# Patient Record
Sex: FEMALE | Race: OTHER | Employment: FULL TIME | URBAN - METROPOLITAN AREA
[De-identification: names, ages, dates, MRNs, and addresses within clinical notes are randomized per-mention and may not be internally consistent; named-entity substitution may affect disease eponyms.]

---

## 2023-03-31 ENCOUNTER — TELEPHONE (OUTPATIENT)
Dept: PRIMARY CARE | Facility: CLINIC | Age: 43
End: 2023-03-31
Payer: COMMERCIAL

## 2023-03-31 NOTE — TELEPHONE ENCOUNTER
Patient was prescribed   Amoxicillin for strep throat she is checking if she has an allergy or not please advise     [phone]

## 2023-04-23 ASSESSMENT — PROMIS GLOBAL HEALTH SCALE
RATE_AVERAGE_PAIN: 0
CARRYOUT_PHYSICAL_ACTIVITIES: COMPLETELY
EMOTIONAL_PROBLEMS: NEVER
CARRYOUT_SOCIAL_ACTIVITIES: EXCELLENT
RATE_MENTAL_HEALTH: EXCELLENT
RATE_AVERAGE_FATIGUE: MILD
RATE_GENERAL_HEALTH: VERY GOOD
RATE_PHYSICAL_HEALTH: VERY GOOD
RATE_QUALITY_OF_LIFE: EXCELLENT
RATE_SOCIAL_SATISFACTION: EXCELLENT

## 2023-04-24 ENCOUNTER — OFFICE VISIT (OUTPATIENT)
Dept: PRIMARY CARE | Facility: CLINIC | Age: 43
End: 2023-04-24
Payer: COMMERCIAL

## 2023-04-24 VITALS
OXYGEN SATURATION: 97 % | TEMPERATURE: 97.2 F | HEART RATE: 80 BPM | BODY MASS INDEX: 21.6 KG/M2 | SYSTOLIC BLOOD PRESSURE: 110 MMHG | DIASTOLIC BLOOD PRESSURE: 72 MMHG | HEIGHT: 61 IN | RESPIRATION RATE: 16 BRPM | WEIGHT: 114.4 LBS

## 2023-04-24 DIAGNOSIS — Z00.00 HEALTHCARE MAINTENANCE: Primary | ICD-10-CM

## 2023-04-24 DIAGNOSIS — R92.8 ABNORMAL MAMMOGRAM OF RIGHT BREAST: ICD-10-CM

## 2023-04-24 DIAGNOSIS — D50.9 IRON DEFICIENCY ANEMIA, UNSPECIFIED IRON DEFICIENCY ANEMIA TYPE: ICD-10-CM

## 2023-04-24 DIAGNOSIS — Z12.31 SCREENING MAMMOGRAM, ENCOUNTER FOR: ICD-10-CM

## 2023-04-24 PROBLEM — R05.9 COUGH: Status: RESOLVED | Noted: 2023-04-24 | Resolved: 2023-04-24

## 2023-04-24 PROBLEM — J40 BRONCHITIS: Status: ACTIVE | Noted: 2023-04-24

## 2023-04-24 PROBLEM — M53.3 COCCYX PAIN: Status: RESOLVED | Noted: 2023-04-24 | Resolved: 2023-04-24

## 2023-04-24 PROBLEM — R79.89 ABNORMAL TSH: Status: RESOLVED | Noted: 2023-04-24 | Resolved: 2023-04-24

## 2023-04-24 PROBLEM — F41.9 ANXIETY: Status: RESOLVED | Noted: 2023-04-24 | Resolved: 2023-04-24

## 2023-04-24 PROBLEM — L30.9 ECZEMA: Status: ACTIVE | Noted: 2023-04-24

## 2023-04-24 PROBLEM — S69.90XA WRIST INJURY: Status: RESOLVED | Noted: 2023-04-24 | Resolved: 2023-04-24

## 2023-04-24 PROBLEM — J06.9 ACUTE URI: Status: RESOLVED | Noted: 2023-04-24 | Resolved: 2023-04-24

## 2023-04-24 PROBLEM — O09.519 ADVANCED MATERNAL AGE, PRIMIGRAVIDA, ANTEPARTUM (HHS-HCC): Status: RESOLVED | Noted: 2018-04-18 | Resolved: 2023-04-24

## 2023-04-24 PROBLEM — R05.9 COUGH: Status: ACTIVE | Noted: 2023-04-24

## 2023-04-24 PROBLEM — N60.09 CYST OF BREAST: Status: ACTIVE | Noted: 2023-04-24

## 2023-04-24 PROBLEM — S60.211A CONTUSION OF RIGHT WRIST: Status: RESOLVED | Noted: 2023-04-24 | Resolved: 2023-04-24

## 2023-04-24 PROBLEM — O09.519 ADVANCED MATERNAL AGE, PRIMIGRAVIDA, ANTEPARTUM (HHS-HCC): Status: ACTIVE | Noted: 2018-04-18

## 2023-04-24 PROBLEM — J06.9 ACUTE URI: Status: ACTIVE | Noted: 2023-04-24

## 2023-04-24 PROBLEM — K52.9 COLITIS: Status: ACTIVE | Noted: 2017-05-25

## 2023-04-24 PROBLEM — F41.9 ANXIETY: Status: ACTIVE | Noted: 2023-04-24

## 2023-04-24 PROBLEM — K57.32 DIVERTICULITIS LARGE INTESTINE: Status: ACTIVE | Noted: 2018-10-26

## 2023-04-24 PROBLEM — A08.4 VIRAL GASTROENTERITIS: Status: ACTIVE | Noted: 2023-04-24

## 2023-04-24 PROBLEM — J40 BRONCHITIS: Status: RESOLVED | Noted: 2023-04-24 | Resolved: 2023-04-24

## 2023-04-24 PROBLEM — A08.4 VIRAL GASTROENTERITIS: Status: RESOLVED | Noted: 2023-04-24 | Resolved: 2023-04-24

## 2023-04-24 PROBLEM — M53.3 COCCYX PAIN: Status: ACTIVE | Noted: 2023-04-24

## 2023-04-24 PROBLEM — D64.9 ANEMIA: Status: ACTIVE | Noted: 2017-09-18

## 2023-04-24 PROBLEM — R14.3 FLATULENCE: Status: RESOLVED | Noted: 2023-04-24 | Resolved: 2023-04-24

## 2023-04-24 PROBLEM — S60.211A CONTUSION OF RIGHT WRIST: Status: ACTIVE | Noted: 2023-04-24

## 2023-04-24 PROBLEM — M22.2X2 PATELLOFEMORAL SYNDROME, LEFT: Status: ACTIVE | Noted: 2023-04-24

## 2023-04-24 PROBLEM — K57.32 DIVERTICULITIS LARGE INTESTINE: Status: RESOLVED | Noted: 2018-10-26 | Resolved: 2023-04-24

## 2023-04-24 PROBLEM — M25.569 PAIN IN JOINT, LOWER LEG: Status: RESOLVED | Noted: 2017-09-18 | Resolved: 2023-04-24

## 2023-04-24 PROBLEM — S69.90XA WRIST INJURY: Status: ACTIVE | Noted: 2023-04-24

## 2023-04-24 PROBLEM — K52.9 COLITIS: Status: RESOLVED | Noted: 2017-05-25 | Resolved: 2023-04-24

## 2023-04-24 PROBLEM — E53.8 B12 DEFICIENCY: Status: ACTIVE | Noted: 2023-04-24

## 2023-04-24 PROBLEM — N76.0 VAGINITIS AND VULVOVAGINITIS: Status: ACTIVE | Noted: 2023-04-24

## 2023-04-24 PROBLEM — D56.3 BETA THALASSEMIA TRAIT: Chronic | Status: ACTIVE | Noted: 2018-10-26

## 2023-04-24 PROBLEM — R10.13 DYSPEPSIA: Status: ACTIVE | Noted: 2023-04-24

## 2023-04-24 PROBLEM — M25.569 PAIN IN JOINT, LOWER LEG: Status: ACTIVE | Noted: 2017-09-18

## 2023-04-24 PROBLEM — N76.0 VAGINITIS AND VULVOVAGINITIS: Status: RESOLVED | Noted: 2023-04-24 | Resolved: 2023-04-24

## 2023-04-24 PROBLEM — M67.52 PLICA SYNDROME, LEFT KNEE: Status: ACTIVE | Noted: 2023-04-24

## 2023-04-24 PROBLEM — R79.89 ABNORMAL TSH: Status: ACTIVE | Noted: 2023-04-24

## 2023-04-24 PROBLEM — R14.3 FLATULENCE: Status: ACTIVE | Noted: 2023-04-24

## 2023-04-24 PROCEDURE — 99396 PREV VISIT EST AGE 40-64: CPT | Performed by: INTERNAL MEDICINE

## 2023-04-24 PROCEDURE — 1036F TOBACCO NON-USER: CPT | Performed by: INTERNAL MEDICINE

## 2023-04-24 RX ORDER — MULTIVITAMIN
TABLET ORAL
COMMUNITY

## 2023-04-24 RX ORDER — CYANOCOBALAMIN (VITAMIN B-12) 250 MCG
250 TABLET ORAL DAILY
COMMUNITY

## 2023-04-24 RX ORDER — FERROUS SULFATE 325(65) MG
TABLET ORAL
COMMUNITY
Start: 2022-04-21

## 2023-04-24 ASSESSMENT — ENCOUNTER SYMPTOMS
MYALGIAS: 0
SORE THROAT: 0
SHORTNESS OF BREATH: 0
WHEEZING: 0
POLYDIPSIA: 0
CHEST TIGHTNESS: 0
CHILLS: 0
DIZZINESS: 0
DYSURIA: 0
DYSPHORIC MOOD: 0
CONSTIPATION: 0
BLOOD IN STOOL: 0
UNEXPECTED WEIGHT CHANGE: 0
FREQUENCY: 0
ABDOMINAL PAIN: 0
ARTHRALGIAS: 1
RHINORRHEA: 0
COUGH: 0
PALPITATIONS: 0
FEVER: 0
POLYPHAGIA: 0
HEADACHES: 0
DIARRHEA: 0
EYE PAIN: 0
NERVOUS/ANXIOUS: 0
HEMATURIA: 0
NAUSEA: 0
WOUND: 0
VOMITING: 0

## 2023-04-24 ASSESSMENT — LIFESTYLE VARIABLES: HOW OFTEN DO YOU HAVE A DRINK CONTAINING ALCOHOL: 4 OR MORE TIMES A WEEK

## 2023-04-24 ASSESSMENT — PATIENT HEALTH QUESTIONNAIRE - PHQ9
1. LITTLE INTEREST OR PLEASURE IN DOING THINGS: NOT AT ALL
2. FEELING DOWN, DEPRESSED OR HOPELESS: NOT AT ALL
SUM OF ALL RESPONSES TO PHQ9 QUESTIONS 1 AND 2: 0

## 2023-04-24 NOTE — PROGRESS NOTES
"Subjective   Patient ID: Radha Harris is a 43 y.o. female who presents for Annual Exam (Pap is due but is on her period.).    HPI     Sees dentists cleaning  Has eye exam in October    On period  Injured knee while running. Resting and doing PT  Getting better  She is always tired and always that way  She takes supplements    Cycles are normal    Review of Systems   Constitutional:  Negative for chills, fever and unexpected weight change.   HENT:  Negative for congestion, hearing loss, rhinorrhea and sore throat.    Eyes:  Negative for pain and visual disturbance.   Respiratory:  Negative for cough, chest tightness, shortness of breath and wheezing.    Cardiovascular:  Negative for chest pain and palpitations.   Gastrointestinal:  Negative for abdominal pain, blood in stool, constipation, diarrhea, nausea and vomiting.   Endocrine: Negative for cold intolerance, heat intolerance, polydipsia and polyphagia.   Genitourinary:  Negative for dysuria, frequency and hematuria.   Musculoskeletal:  Positive for arthralgias. Negative for myalgias.   Skin:  Negative for rash and wound.   Neurological:  Negative for dizziness, syncope and headaches.   Psychiatric/Behavioral:  Negative for dysphoric mood. The patient is not nervous/anxious.        Objective   /72   Pulse 80   Temp 36.2 °C (97.2 °F)   Resp 16   Ht 1.556 m (5' 1.25\")   Wt 51.9 kg (114 lb 6.4 oz)   SpO2 97%   BMI 21.44 kg/m²     Physical Exam  Vitals reviewed.   Constitutional:       Appearance: Normal appearance. She is not ill-appearing.   HENT:      Head: Normocephalic and atraumatic.      Right Ear: Tympanic membrane normal.      Left Ear: Tympanic membrane normal.      Nose: Nose normal.      Mouth/Throat:      Mouth: Mucous membranes are dry.      Pharynx: Oropharynx is clear.   Eyes:      Extraocular Movements: Extraocular movements intact.      Conjunctiva/sclera: Conjunctivae normal.      Pupils: Pupils are equal, round, and reactive to " light.   Cardiovascular:      Rate and Rhythm: Normal rate and regular rhythm.   Pulmonary:      Effort: Pulmonary effort is normal.      Breath sounds: Normal breath sounds. No wheezing.   Chest:   Breasts:     Right: Normal. No mass, nipple discharge, skin change or tenderness.      Left: Normal. No mass, nipple discharge, skin change or tenderness.   Abdominal:      General: There is no distension.      Palpations: Abdomen is soft. There is no mass.      Tenderness: There is no abdominal tenderness.   Musculoskeletal:         General: No swelling.      Cervical back: Neck supple.   Lymphadenopathy:      Cervical: No cervical adenopathy.   Neurological:      General: No focal deficit present.      Mental Status: She is alert and oriented to person, place, and time.      Gait: Gait normal.   Psychiatric:         Mood and Affect: Mood normal.         Behavior: Behavior normal.         Thought Content: Thought content normal.         Assessment/Plan   Problem List Items Addressed This Visit          Hematologic    Anemia    Relevant Orders    Vitamin B12    Ferritin    Iron and TIBC     Other Visit Diagnoses       Healthcare maintenance    -  Primary    Relevant Orders    CBC    Comprehensive Metabolic Panel    Lipid Panel    TSH with reflex to Free T4 if abnormal    Abnormal mammogram of right breast        Relevant Orders    BI mammo bilateral diagnostic tomosynthesis    Screening mammogram, encounter for        Relevant Orders    BI mammo bilateral diagnostic tomosynthesis             CPE completed.  Advised to keep a heart healthy, low fat diet with fruits and veggies like Mediterranean diet.  Advised on the importance of exercise and maintaining 150 minutes of exercise per week (30 minutes per day 5 days a week).  Advised on regular eye and dental visits.  Discussed age appropriate cancer screening, immunizations and recommendations given.  Discussed avoiding illicit drugs and tobacco. Advised on appropriate use  of alcohol.  Advised to wear seat belt.     Hx of colitis and hospitalization in 2017: never occurred again     Anemia 2/2 beta thalassemia minor: saw heme  -on iron    Left knee pain: seeing ortho and doing PT     Coccyx pain: prominent coccyx bone, recs xrays-wants to wait as not too bothersome     Diverticulosis     CPE 1 year. Labs and mammo ordered     Health Maintenance  -Pap smear: 12/10/18 normal, repeat 5 years  -Mammogram: 7/22-abnormal, never followup, due, ordered  -Colonoscopy: 6/19/2017-received records. Normal and repeat 2027  -Vaccinations: UTD tdap, flu and covid  -DEXA: at 65     . 1 son.

## 2023-10-13 ENCOUNTER — OFFICE VISIT (OUTPATIENT)
Dept: PRIMARY CARE | Facility: CLINIC | Age: 43
End: 2023-10-13
Payer: COMMERCIAL

## 2023-10-13 VITALS
HEART RATE: 73 BPM | BODY MASS INDEX: 20.09 KG/M2 | SYSTOLIC BLOOD PRESSURE: 102 MMHG | TEMPERATURE: 96.3 F | WEIGHT: 106.4 LBS | OXYGEN SATURATION: 97 % | HEIGHT: 61 IN | DIASTOLIC BLOOD PRESSURE: 67 MMHG

## 2023-10-13 DIAGNOSIS — J32.9 SINUSITIS, UNSPECIFIED CHRONICITY, UNSPECIFIED LOCATION: Primary | ICD-10-CM

## 2023-10-13 PROCEDURE — 1036F TOBACCO NON-USER: CPT | Performed by: NURSE PRACTITIONER

## 2023-10-13 PROCEDURE — 99213 OFFICE O/P EST LOW 20 MIN: CPT | Performed by: NURSE PRACTITIONER

## 2023-10-13 RX ORDER — FLUTICASONE PROPIONATE 50 MCG
1 SPRAY, SUSPENSION (ML) NASAL DAILY
Qty: 16 G | Refills: 5 | Status: SHIPPED | OUTPATIENT
Start: 2023-10-13 | End: 2024-03-15 | Stop reason: ALTCHOICE

## 2023-10-13 RX ORDER — AMOXICILLIN AND CLAVULANATE POTASSIUM 875; 125 MG/1; MG/1
875 TABLET, FILM COATED ORAL 2 TIMES DAILY
Qty: 20 TABLET | Refills: 0 | Status: SHIPPED | OUTPATIENT
Start: 2023-10-13 | End: 2023-10-23

## 2023-10-13 ASSESSMENT — PATIENT HEALTH QUESTIONNAIRE - PHQ9
1. LITTLE INTEREST OR PLEASURE IN DOING THINGS: NOT AT ALL
SUM OF ALL RESPONSES TO PHQ9 QUESTIONS 1 AND 2: 0
2. FEELING DOWN, DEPRESSED OR HOPELESS: NOT AT ALL

## 2023-10-13 NOTE — PROGRESS NOTES
"Subjective   Patient ID: Radha Harris is a 43 y.o. female who presents for Otitis Media, Sore Throat, and Nasal Congestion.    Having for the past 3 weeks sinus congestion  Today is the first day her mucous and congestion feels good. Morning is worse typically.  Today she complained of headache and stomach was quesy. No vomiting.  Today is the first day she is feeling better.         Review of Systems  otherwise negative aside from what was mentioned above in HPI.    Objective   /67   Pulse 73   Temp 35.7 °C (96.3 °F)   Ht 1.556 m (5' 1.25\")   Wt 48.3 kg (106 lb 6.4 oz)   SpO2 97%   BMI 19.94 kg/m²     Physical Exam  Constitutional:       Appearance: Normal appearance.   HENT:      Right Ear: Tympanic membrane normal.      Left Ear: Tympanic membrane normal.   Eyes:      Conjunctiva/sclera: Conjunctivae normal.   Cardiovascular:      Rate and Rhythm: Normal rate and regular rhythm.   Pulmonary:      Effort: Pulmonary effort is normal.      Breath sounds: Normal breath sounds.   Abdominal:      Palpations: Abdomen is soft.   Neurological:      Mental Status: She is alert.   Psychiatric:         Mood and Affect: Mood normal.         Thought Content: Thought content normal.         Assessment/Plan   Problem List Items Addressed This Visit    None  Visit Diagnoses         Codes    Sinusitis, unspecified chronicity, unspecified location    -  Primary J32.9    Relevant Medications    amoxicillin-pot clavulanate (Augmentin) 875-125 mg tablet    fluticasone (Flonase) 50 mcg/actuation nasal spray        She will not use ABX unless worsening again since it had been 3 weeks.  This visit was completed via telephone/virtual visit. All issues as documented below were discussed and addressed but no physical exam was performed. If it was felt that the patient should be evaluated via  face-to-face office appointment(s) they were directed there. The patient verbally consented to this visit.       "

## 2023-12-27 ENCOUNTER — TELEPHONE (OUTPATIENT)
Dept: PRIMARY CARE | Facility: CLINIC | Age: 43
End: 2023-12-27
Payer: COMMERCIAL

## 2023-12-27 NOTE — TELEPHONE ENCOUNTER
Pt has had some vaginal itching since Friday.    She is wanting to know what she can use OTC to help.    Please advise.

## 2023-12-29 ENCOUNTER — TELEPHONE (OUTPATIENT)
Dept: PRIMARY CARE | Facility: CLINIC | Age: 43
End: 2023-12-29
Payer: COMMERCIAL

## 2023-12-29 DIAGNOSIS — B37.9 YEAST INFECTION: Primary | ICD-10-CM

## 2023-12-29 RX ORDER — FLUCONAZOLE 150 MG/1
150 TABLET ORAL ONCE
Qty: 1 TABLET | Refills: 0 | Status: SHIPPED | OUTPATIENT
Start: 2023-12-29 | End: 2023-12-29

## 2023-12-29 NOTE — TELEPHONE ENCOUNTER
Pt went to Urgent Care for yeast infection - was prescribed diflucan - pt took medication Wed and is not getting any better - pt wants to know if can still take monistat 7 day also? Also pt believes she has developed hemorrhoids- wants to know if ok also to apply hemoorrhoid cream with the monistat? Please call and advise

## 2024-01-02 ENCOUNTER — TELEPHONE (OUTPATIENT)
Dept: PRIMARY CARE | Facility: CLINIC | Age: 44
End: 2024-01-02
Payer: COMMERCIAL

## 2024-01-02 DIAGNOSIS — B37.9 YEAST INFECTION: Primary | ICD-10-CM

## 2024-01-02 NOTE — TELEPHONE ENCOUNTER
Patient states that her yeast infection is still not cleared up. Patient is on day 3 of the second Diflucan dose and also has 3 tubes left of a 7 day Monistat. Is there something else that patient can take to clear it up? Does patient need to come in for an appt? Please advise.

## 2024-01-03 ENCOUNTER — OFFICE VISIT (OUTPATIENT)
Dept: OBSTETRICS AND GYNECOLOGY | Facility: CLINIC | Age: 44
End: 2024-01-03
Payer: COMMERCIAL

## 2024-01-03 VITALS — SYSTOLIC BLOOD PRESSURE: 112 MMHG | WEIGHT: 110 LBS | BODY MASS INDEX: 20.61 KG/M2 | DIASTOLIC BLOOD PRESSURE: 70 MMHG

## 2024-01-03 DIAGNOSIS — B37.9 YEAST INFECTION: ICD-10-CM

## 2024-01-03 DIAGNOSIS — R21 PERIANAL RASH: ICD-10-CM

## 2024-01-03 DIAGNOSIS — L29.9 ITCHING: ICD-10-CM

## 2024-01-03 DIAGNOSIS — N89.8 VAGINAL ITCHING: Primary | ICD-10-CM

## 2024-01-03 DIAGNOSIS — N89.8 VAGINAL ODOR: ICD-10-CM

## 2024-01-03 PROCEDURE — 99203 OFFICE O/P NEW LOW 30 MIN: CPT

## 2024-01-03 PROCEDURE — 87205 SMEAR GRAM STAIN: CPT

## 2024-01-03 PROCEDURE — 1036F TOBACCO NON-USER: CPT

## 2024-01-03 ASSESSMENT — ENCOUNTER SYMPTOMS
CARDIOVASCULAR NEGATIVE: 0
GASTROINTESTINAL NEGATIVE: 0
RESPIRATORY NEGATIVE: 0
EYES NEGATIVE: 0
ALLERGIC/IMMUNOLOGIC NEGATIVE: 0
CONSTITUTIONAL NEGATIVE: 0
PSYCHIATRIC NEGATIVE: 0
NEUROLOGICAL NEGATIVE: 0
ENDOCRINE NEGATIVE: 0
HEMATOLOGIC/LYMPHATIC NEGATIVE: 0
MUSCULOSKELETAL NEGATIVE: 0

## 2024-01-03 NOTE — PROGRESS NOTES
Subjective   Patient ID: Radha Harris is a 43 y.o. female who presents for Vaginitis/Bacterial Vaginosis (Patient states that she has a yeast infection. Seen 12/26 at urgent care- was given diflucan, used monistat (7) also, was given second dose of Diflucan within 72 hours. /Last used Monistat last night./Rectal area is itchy, rash (x 2 days), also has rash in groin/ upper thigh area, very itchy./No changes to laundry detergent or soap. Goes to hot yoga, could be the pants that she is using. /Used coconut oil and tea tree oil 2x yesterday. Sits bath with tea tree oil. /She is very uncomfortable. ).    HPI  Patient presents to the office today with concern for yeast infection.  States that she goes to hot yoga, wore a different kind of legging, and felt that it didn't absorb the sweat well.  Vaginal itching began on 12/23.  Was seen in urgent care on 12/26 and prescribed PO Diflucan without relief of sx.  Was prescribed a second dose of Diflucan and took on 12/30, still without relief of symptoms.  At the same time, has also been using a 7-day course of OTC Monistat.  Now, states that she is still having vaginal itching, plus vaginal odor and a yellow discharge.  The itching has gone down near her anal area -- She is applying tea tree oil and coconut oil.  She is sexually active with one partner; denies any concern for STI.      Review of Systems   Genitourinary:  Positive for vaginal discharge.        Vaginal odor; vaginal itching.   All other systems reviewed and are negative.      Objective   Physical Exam  Constitutional:       Appearance: Normal appearance.   HENT:      Head: Normocephalic.   Pulmonary:      Effort: Pulmonary effort is normal.   Genitourinary:     General: Normal vulva.      Vagina: Vaginal discharge present.      Comments: Cleo-anal fungal appearing rash noted.  Skin:     General: Skin is warm and dry.   Neurological:      Mental Status: She is alert and oriented to person, place, and time.    Psychiatric:         Mood and Affect: Mood normal.         Assessment/Plan   Diagnoses and all orders for this visit:  Vaginal itching/Odor  -     Vaginitis Gram Stain For Bacterial Vaginosis + Yeast  -     Advised use of boric acid 600 mg. capsules intravaginally once a day for 14 days.  -     Advised use of Rephresh vaginal health probiotics.   -     Discussed use of cotton underwear; avoid long periods in wet swimsuits or sweaty workout clothes.  -     No tight pants; no soaps with color or odor; sensitive skin laundry detergent.     Perianal rash  - Advised use of OTC anti-fungal cream (clotrimazole) to perianal rash.    Will await results prior to treatment.  Patient reports understanding, all questions answered.    ERICKA Torres 01/03/24 2:52 PM

## 2024-01-04 ENCOUNTER — TELEPHONE (OUTPATIENT)
Dept: OBSTETRICS AND GYNECOLOGY | Facility: CLINIC | Age: 44
End: 2024-01-04
Payer: COMMERCIAL

## 2024-01-04 LAB
BACTERIAL VAGINOSIS VAG-IMP: ABNORMAL
CLUE CELLS VAG LPF-#/AREA: ABNORMAL /[LPF]
NUGENT SCORE: 4
YEAST VAG WET PREP-#/AREA: PRESENT

## 2024-01-04 RX ORDER — TERCONAZOLE 80 MG/1
80 SUPPOSITORY VAGINAL NIGHTLY
Qty: 3 SUPPOSITORY | Refills: 0 | Status: SHIPPED | OUTPATIENT
Start: 2024-01-04 | End: 2024-01-07

## 2024-01-05 ENCOUNTER — TELEPHONE (OUTPATIENT)
Dept: OBSTETRICS AND GYNECOLOGY | Facility: CLINIC | Age: 44
End: 2024-01-05
Payer: COMMERCIAL

## 2024-01-07 NOTE — PROGRESS NOTES
Subjective   Patient ID [id number]   Radha Harris is a 43 y.o.  who presents today for follow-up for vaginitis. She was seen at [facility] on  for vaginal discharge and pruritus. Vaginitis swab +candida glabrata. She was treated with diflucan x2 doses (last on ) as well as topical clotrimazole. She also completed a 7 day course of OTC Monistat. She was then seen at  on 1/3 with persistence of symptoms. Was started on 14 day course of vaginal boric acid. Vaginitis panel re-sent and +yeast, no speciation or sensitivities. Now presenting today with persistent symptoms. She has only taken one dose of boric acid.     Objective   Physical Exam  Vitals:    24 0934   BP: 104/60      Gen: awake, alert  Head: NCAT  HEENT: moist mucus membranes  Pulm: breathing comfortably on room air  CV: warm and well-perfused  : external vulvar erythema and excoriation, mild amount of thin white discharge in vaginal vault  Neuro: alert and oriented  Psych: appropriate affect     Assessment/Plan     Radha Harris is a 43 y.o.  who presents today for follow-up of vaginitis.    Vaginitis  -Yeast culture from [facility] +candida glabrata on , no sensitivities  -Repeat vaginitis panel sent 1/3 also +yeast, no speciation or sensitivities sent, -BV  -Discussed candida glabrata at length. Often resistant to azoles. Encouraged to continue vaginal boric acid x14 days. To d/c topical clotrimazole and trial nystatin-triamcinolone ointment BID    Follow up in 2 weeks if not improved.    Rosa Garcia MD

## 2024-01-08 ENCOUNTER — OFFICE VISIT (OUTPATIENT)
Dept: OBSTETRICS AND GYNECOLOGY | Facility: CLINIC | Age: 44
End: 2024-01-08
Payer: COMMERCIAL

## 2024-01-08 VITALS
BODY MASS INDEX: 20.62 KG/M2 | WEIGHT: 109.25 LBS | DIASTOLIC BLOOD PRESSURE: 60 MMHG | HEIGHT: 61 IN | SYSTOLIC BLOOD PRESSURE: 104 MMHG

## 2024-01-08 DIAGNOSIS — B37.9 CANDIDA GLABRATA INFECTION: ICD-10-CM

## 2024-01-08 DIAGNOSIS — N89.8 VAGINAL ITCHING: Primary | ICD-10-CM

## 2024-01-08 PROCEDURE — 99213 OFFICE O/P EST LOW 20 MIN: CPT | Performed by: STUDENT IN AN ORGANIZED HEALTH CARE EDUCATION/TRAINING PROGRAM

## 2024-01-08 PROCEDURE — 1036F TOBACCO NON-USER: CPT | Performed by: STUDENT IN AN ORGANIZED HEALTH CARE EDUCATION/TRAINING PROGRAM

## 2024-01-08 RX ORDER — NYSTATIN AND TRIAMCINOLONE ACETONIDE 100000; 1 [USP'U]/G; MG/G
OINTMENT TOPICAL 2 TIMES DAILY
Qty: 30 G | Refills: 0 | Status: SHIPPED | OUTPATIENT
Start: 2024-01-08 | End: 2024-03-15 | Stop reason: WASHOUT

## 2024-01-22 ENCOUNTER — OFFICE VISIT (OUTPATIENT)
Dept: OBSTETRICS AND GYNECOLOGY | Facility: CLINIC | Age: 44
End: 2024-01-22
Payer: COMMERCIAL

## 2024-01-22 VITALS
WEIGHT: 111.5 LBS | SYSTOLIC BLOOD PRESSURE: 104 MMHG | HEIGHT: 61 IN | DIASTOLIC BLOOD PRESSURE: 64 MMHG | BODY MASS INDEX: 21.05 KG/M2

## 2024-01-22 DIAGNOSIS — N89.8 VAGINAL ITCHING: ICD-10-CM

## 2024-01-22 DIAGNOSIS — B37.9 YEAST INFECTION: Primary | ICD-10-CM

## 2024-01-22 LAB
BACTERIAL VAGINOSIS VAG-IMP: NORMAL
CLUE CELLS VAG LPF-#/AREA: NORMAL /[LPF]
NUGENT SCORE: 4
YEAST VAG WET PREP-#/AREA: NORMAL

## 2024-01-22 PROCEDURE — 99213 OFFICE O/P EST LOW 20 MIN: CPT | Performed by: OBSTETRICS & GYNECOLOGY

## 2024-01-22 PROCEDURE — 1036F TOBACCO NON-USER: CPT | Performed by: OBSTETRICS & GYNECOLOGY

## 2024-01-22 PROCEDURE — 87205 SMEAR GRAM STAIN: CPT

## 2024-01-22 NOTE — PROGRESS NOTES
Patient here for vaginal itching- mild  Did Boric acid for 2 weeks  Still using vaginal Rephresh   Had intercourse last night, burning   Seen Radha on 2024    Subjective   Patient ID: Radha Harris is a 43 y.o. female who presents for Follow-up (Vaginitis /Seen Radha on 2024).    HPI  42 y/o  presenting for follow up of candida glabrata s/p 2 wks of boric acid suppositories.  Feels 95% better. Had intercourse last night however which was uncomfortable and still feels itchy.     Review of Systems   Genitourinary:  Positive for vaginal discharge.   All other systems reviewed and are negative.      Objective   Physical Exam  Gen in NAD  Pelvic: very mild external vulvar erythema (much improved per patient), cervix normal appearing, small amount of thin white discharge in vaginal vault    Assessment/Plan   42 y/o  presenting for follow up visit of candida glabrata yeast infection.  Much improved after 2 wks of boric acid, 95% better.  She is still feeling slightly itching and sex yesterday was painful.  Repeat swab done today to help guide if she needs any further treatment.  Will call patient with results.    Roseanna Villanueva MD 24 11:57 AM

## 2024-01-24 ENCOUNTER — TELEPHONE (OUTPATIENT)
Dept: OBSTETRICS AND GYNECOLOGY | Facility: CLINIC | Age: 44
End: 2024-01-24
Payer: COMMERCIAL

## 2024-01-24 NOTE — TELEPHONE ENCOUNTER
----- Message from Roseanna Villanueva MD sent at 1/23/2024  8:23 AM EST -----  Can you let her know her culture is now negative for yeast? Thanks so much.  She would watch symptoms for now   I called and spoke with the patient. I advised her of her results. All questions answered.     CATALINA Shaikh  ----- Message -----  From: Lab, Background User  Sent: 1/22/2024   8:39 PM EST  To: Roseanna Villanueva MD

## 2024-01-25 ENCOUNTER — APPOINTMENT (OUTPATIENT)
Dept: OBSTETRICS AND GYNECOLOGY | Facility: CLINIC | Age: 44
End: 2024-01-25
Payer: COMMERCIAL

## 2024-03-15 ENCOUNTER — OFFICE VISIT (OUTPATIENT)
Dept: PRIMARY CARE | Facility: CLINIC | Age: 44
End: 2024-03-15
Payer: COMMERCIAL

## 2024-03-15 VITALS
HEIGHT: 61 IN | DIASTOLIC BLOOD PRESSURE: 60 MMHG | SYSTOLIC BLOOD PRESSURE: 100 MMHG | TEMPERATURE: 98.2 F | RESPIRATION RATE: 16 BRPM | HEART RATE: 88 BPM | WEIGHT: 109 LBS | BODY MASS INDEX: 20.58 KG/M2 | OXYGEN SATURATION: 98 %

## 2024-03-15 DIAGNOSIS — D56.3 BETA THALASSEMIA TRAIT: Chronic | ICD-10-CM

## 2024-03-15 DIAGNOSIS — Z00.00 HEALTH CARE MAINTENANCE: Primary | ICD-10-CM

## 2024-03-15 DIAGNOSIS — M25.811 IMPINGEMENT OF RIGHT SHOULDER: ICD-10-CM

## 2024-03-15 DIAGNOSIS — Z12.31 ENCOUNTER FOR SCREENING MAMMOGRAM FOR MALIGNANT NEOPLASM OF BREAST: ICD-10-CM

## 2024-03-15 PROCEDURE — 99396 PREV VISIT EST AGE 40-64: CPT | Performed by: INTERNAL MEDICINE

## 2024-03-15 PROCEDURE — 1036F TOBACCO NON-USER: CPT | Performed by: INTERNAL MEDICINE

## 2024-03-15 ASSESSMENT — ENCOUNTER SYMPTOMS
WOUND: 0
HEADACHES: 0
EYE PAIN: 0
VOMITING: 0
RHINORRHEA: 0
FREQUENCY: 0
CHILLS: 0
WHEEZING: 0
POLYPHAGIA: 0
ABDOMINAL PAIN: 0
UNEXPECTED WEIGHT CHANGE: 0
BLOOD IN STOOL: 0
DIARRHEA: 0
ARTHRALGIAS: 1
PALPITATIONS: 0
FEVER: 0
NAUSEA: 0
DIZZINESS: 0
SORE THROAT: 0
SHORTNESS OF BREATH: 0
POLYDIPSIA: 0
NERVOUS/ANXIOUS: 0
MYALGIAS: 0
COUGH: 0
HEMATURIA: 0
CHEST TIGHTNESS: 0
CONSTIPATION: 0
DYSPHORIC MOOD: 0
DYSURIA: 0

## 2024-03-15 ASSESSMENT — PATIENT HEALTH QUESTIONNAIRE - PHQ9
2. FEELING DOWN, DEPRESSED OR HOPELESS: NOT AT ALL
SUM OF ALL RESPONSES TO PHQ9 QUESTIONS 1 AND 2: 0
1. LITTLE INTEREST OR PLEASURE IN DOING THINGS: NOT AT ALL

## 2024-03-15 NOTE — PROGRESS NOTES
"Subjective   Patient ID: Radha Harris is a 43 y.o. female who presents for Annual Exam (Patient is here for annual physical).    HPI       Dental visits- UTD  Eye visits-UTD    Exercise- yoga, runs, bike  Healthy    Had issue with bad candida infection. Saw gyn  Used boric acid    She states has resolved  On period  Due for pap    She tweaked her shoulder. Unsure what she dud, it is her right shoulder. Getting better. Has normal ROM but certain thinks make it worse    Review of Systems   Constitutional:  Negative for chills, fever and unexpected weight change.   HENT:  Negative for congestion, hearing loss, rhinorrhea and sore throat.    Eyes:  Negative for pain and visual disturbance.   Respiratory:  Negative for cough, chest tightness, shortness of breath and wheezing.    Cardiovascular:  Negative for chest pain and palpitations.   Gastrointestinal:  Negative for abdominal pain, blood in stool, constipation, diarrhea, nausea and vomiting.   Endocrine: Negative for cold intolerance, heat intolerance, polydipsia and polyphagia.   Genitourinary:  Negative for dysuria, frequency and hematuria.   Musculoskeletal:  Positive for arthralgias. Negative for myalgias.   Skin:  Negative for rash and wound.   Neurological:  Negative for dizziness, syncope and headaches.   Psychiatric/Behavioral:  Negative for dysphoric mood. The patient is not nervous/anxious.        Objective   /60 (BP Location: Left arm, Patient Position: Sitting, BP Cuff Size: Adult)   Pulse 88   Temp 36.8 °C (98.2 °F) (Temporal)   Resp 16   Ht 1.549 m (5' 1\")   Wt 49.4 kg (109 lb)   SpO2 98%   BMI 20.60 kg/m²     Physical Exam  Vitals reviewed.   Constitutional:       Appearance: Normal appearance. She is not ill-appearing.   HENT:      Head: Normocephalic and atraumatic.      Right Ear: Tympanic membrane normal.      Left Ear: Tympanic membrane normal.      Nose: Nose normal.      Mouth/Throat:      Mouth: Mucous membranes are dry.      " Pharynx: Oropharynx is clear.   Eyes:      Extraocular Movements: Extraocular movements intact.      Conjunctiva/sclera: Conjunctivae normal.      Pupils: Pupils are equal, round, and reactive to light.   Cardiovascular:      Rate and Rhythm: Normal rate and regular rhythm.   Pulmonary:      Effort: Pulmonary effort is normal.      Breath sounds: Normal breath sounds. No wheezing.   Abdominal:      General: There is no distension.      Palpations: Abdomen is soft. There is no mass.      Tenderness: There is no abdominal tenderness.   Musculoskeletal:         General: No swelling.      Cervical back: Neck supple.      Comments: Left shoulder and right shoulder ROM normal  Non tender  Pain with extension  +Molina   Lymphadenopathy:      Cervical: No cervical adenopathy.   Neurological:      General: No focal deficit present.      Mental Status: She is alert and oriented to person, place, and time.      Gait: Gait normal.   Psychiatric:         Mood and Affect: Mood normal.         Behavior: Behavior normal.         Thought Content: Thought content normal.         Assessment/Plan   Problem List Items Addressed This Visit             ICD-10-CM    Beta thalassemia trait (Chronic) D56.3    Relevant Orders    Ferritin    Iron and TIBC     Other Visit Diagnoses         Codes    Health care maintenance    -  Primary Z00.00    Relevant Orders    CBC    Comprehensive Metabolic Panel    Lipid Panel    TSH with reflex to Free T4 if abnormal    Encounter for screening mammogram for malignant neoplasm of breast     Z12.31    Relevant Orders    BI mammo bilateral screening tomosynthesis    Impingement of right shoulder     M25.811             CPE completed.  Advised to keep a heart healthy, low fat diet with fruits and veggies like Mediterranean diet.  Advised on the importance of exercise and maintaining 150 minutes of exercise per week (30 minutes per day 5 days a week).  Advised on regular eye and dental visits.  Discussed age  appropriate cancer screening, immunizations and recommendations given.  Discussed avoiding illicit drugs and tobacco. Advised on appropriate use of alcohol.  Advised to wear seat belt.    Impingement of right shoulder-stretching  -PT exercises on youtube  -if not resolving-PT and ortho     Hx of colitis and hospitalization in 2017: never occurred again     Anemia 2/2 beta thalassemia minor: saw heme  -on iron     Left knee pain: seeing ortho and doing PT     Coccyx pain: prominent coccyx bone, recs xrays-wants to wait as not too bothersome     Diverticulosis     CPE 1 year. Labs and mammo ordered. Schedule pap only     Health Maintenance  -Pap smear: 12/10/18 normal, repeat 5 years  -Mammogram: 7/22-abnormal, never followup, due, ordered  -Colonoscopy: 6/19/2017-received records. Normal and repeat 2027  -Vaccinations: UTD tdap, flu and covid  -DEXA: at 65     . 1 son.

## 2024-03-22 ENCOUNTER — LAB (OUTPATIENT)
Dept: LAB | Facility: LAB | Age: 44
End: 2024-03-22
Payer: COMMERCIAL

## 2024-03-22 ENCOUNTER — OFFICE VISIT (OUTPATIENT)
Dept: PRIMARY CARE | Facility: CLINIC | Age: 44
End: 2024-03-22
Payer: COMMERCIAL

## 2024-03-22 VITALS
OXYGEN SATURATION: 98 % | HEIGHT: 61 IN | HEART RATE: 66 BPM | SYSTOLIC BLOOD PRESSURE: 100 MMHG | WEIGHT: 114.4 LBS | DIASTOLIC BLOOD PRESSURE: 67 MMHG | BODY MASS INDEX: 21.6 KG/M2 | RESPIRATION RATE: 16 BRPM | TEMPERATURE: 98.1 F

## 2024-03-22 DIAGNOSIS — Z00.00 HEALTH CARE MAINTENANCE: ICD-10-CM

## 2024-03-22 DIAGNOSIS — Z00.00 HEALTHCARE MAINTENANCE: ICD-10-CM

## 2024-03-22 DIAGNOSIS — D50.9 IRON DEFICIENCY ANEMIA, UNSPECIFIED IRON DEFICIENCY ANEMIA TYPE: ICD-10-CM

## 2024-03-22 DIAGNOSIS — D56.3 BETA THALASSEMIA TRAIT: Chronic | ICD-10-CM

## 2024-03-22 DIAGNOSIS — Z01.419 ENCOUNTER FOR GYNECOLOGICAL EXAMINATION: Primary | ICD-10-CM

## 2024-03-22 LAB
ALBUMIN SERPL BCP-MCNC: 4.1 G/DL (ref 3.4–5)
ALP SERPL-CCNC: 42 U/L (ref 33–110)
ALT SERPL W P-5'-P-CCNC: 14 U/L (ref 7–45)
ANION GAP SERPL CALC-SCNC: 11 MMOL/L (ref 10–20)
AST SERPL W P-5'-P-CCNC: 16 U/L (ref 9–39)
BILIRUB SERPL-MCNC: 0.9 MG/DL (ref 0–1.2)
BUN SERPL-MCNC: 9 MG/DL (ref 6–23)
CALCIUM SERPL-MCNC: 9.3 MG/DL (ref 8.6–10.6)
CHLORIDE SERPL-SCNC: 104 MMOL/L (ref 98–107)
CHOLEST SERPL-MCNC: 147 MG/DL (ref 0–199)
CHOLESTEROL/HDL RATIO: 2.6
CO2 SERPL-SCNC: 29 MMOL/L (ref 21–32)
CREAT SERPL-MCNC: 0.66 MG/DL (ref 0.5–1.05)
EGFRCR SERPLBLD CKD-EPI 2021: >90 ML/MIN/1.73M*2
ERYTHROCYTE [DISTWIDTH] IN BLOOD BY AUTOMATED COUNT: 20.3 % (ref 11.5–14.5)
FERRITIN SERPL-MCNC: 174 NG/ML (ref 8–150)
GLUCOSE SERPL-MCNC: 85 MG/DL (ref 74–99)
HCT VFR BLD AUTO: 28.3 % (ref 36–46)
HDLC SERPL-MCNC: 55.7 MG/DL
HGB BLD-MCNC: 10.6 G/DL (ref 12–16)
IRON SATN MFR SERPL: 37 % (ref 25–45)
IRON SERPL-MCNC: 110 UG/DL (ref 35–150)
LDLC SERPL CALC-MCNC: 81 MG/DL
MCH RBC QN AUTO: 29.8 PG (ref 26–34)
MCHC RBC AUTO-ENTMCNC: 37.5 G/DL (ref 32–36)
MCV RBC AUTO: 80 FL (ref 80–100)
NON HDL CHOLESTEROL: 91 MG/DL (ref 0–149)
NRBC BLD-RTO: 0 /100 WBCS (ref 0–0)
PLATELET # BLD AUTO: 248 X10*3/UL (ref 150–450)
POTASSIUM SERPL-SCNC: 4 MMOL/L (ref 3.5–5.3)
PROT SERPL-MCNC: 6.8 G/DL (ref 6.4–8.2)
RBC # BLD AUTO: 3.56 X10*6/UL (ref 4–5.2)
SODIUM SERPL-SCNC: 140 MMOL/L (ref 136–145)
TIBC SERPL-MCNC: 296 UG/DL (ref 240–445)
TRIGL SERPL-MCNC: 54 MG/DL (ref 0–149)
TSH SERPL-ACNC: 0.82 MIU/L (ref 0.44–3.98)
UIBC SERPL-MCNC: 186 UG/DL (ref 110–370)
VIT B12 SERPL-MCNC: 690 PG/ML (ref 211–911)
VLDL: 11 MG/DL (ref 0–40)
WBC # BLD AUTO: 4.3 X10*3/UL (ref 4.4–11.3)

## 2024-03-22 PROCEDURE — 82607 VITAMIN B-12: CPT

## 2024-03-22 PROCEDURE — 36415 COLL VENOUS BLD VENIPUNCTURE: CPT

## 2024-03-22 PROCEDURE — 85027 COMPLETE CBC AUTOMATED: CPT

## 2024-03-22 PROCEDURE — 1036F TOBACCO NON-USER: CPT | Performed by: INTERNAL MEDICINE

## 2024-03-22 PROCEDURE — 87624 HPV HI-RISK TYP POOLED RSLT: CPT

## 2024-03-22 PROCEDURE — 83550 IRON BINDING TEST: CPT

## 2024-03-22 PROCEDURE — 84443 ASSAY THYROID STIM HORMONE: CPT

## 2024-03-22 PROCEDURE — 88175 CYTOPATH C/V AUTO FLUID REDO: CPT

## 2024-03-22 PROCEDURE — 80061 LIPID PANEL: CPT

## 2024-03-22 PROCEDURE — 82728 ASSAY OF FERRITIN: CPT

## 2024-03-22 PROCEDURE — 80053 COMPREHEN METABOLIC PANEL: CPT

## 2024-03-22 PROCEDURE — 83540 ASSAY OF IRON: CPT

## 2024-03-22 NOTE — PROGRESS NOTES
"Subjective   Patient ID: Radha Harris is a 43 y.o. female who presents for Gynecologic Exam.    Gynecologic Exam       Was on period last week  Here for pap only  See previous note    Review of Systems    Objective   /67 (BP Location: Left arm, Patient Position: Sitting, BP Cuff Size: Adult)   Pulse 66   Temp 36.7 °C (98.1 °F)   Resp 16   Ht 1.549 m (5' 1\")   Wt 51.9 kg (114 lb 6.4 oz)   SpO2 98%   BMI 21.62 kg/m²     Physical Exam  Genitourinary:     General: Normal vulva.      Vagina: Normal.      Cervix: Normal.         Assessment/Plan   Problem List Items Addressed This Visit    None  Visit Diagnoses         Codes    Encounter for gynecological examination    -  Primary Z01.419    Relevant Orders    THINPREP PAP TEST             No charge  Pap only visit. Completed CPE last week  "

## 2024-03-25 ENCOUNTER — TELEPHONE (OUTPATIENT)
Dept: PRIMARY CARE | Facility: CLINIC | Age: 44
End: 2024-03-25
Payer: COMMERCIAL

## 2024-03-25 DIAGNOSIS — M25.519 CHRONIC SHOULDER PAIN, UNSPECIFIED LATERALITY: Primary | ICD-10-CM

## 2024-03-25 DIAGNOSIS — G89.29 CHRONIC SHOULDER PAIN, UNSPECIFIED LATERALITY: Primary | ICD-10-CM

## 2024-03-25 NOTE — TELEPHONE ENCOUNTER
Pt is still having problems with her shoulder and forgot to ask Dr. Shah last week if she could give her a referral to orthopedics and who she would recommend. Please advise

## 2024-03-30 LAB
CYTOLOGY CMNT CVX/VAG CYTO-IMP: NORMAL
HPV HR 12 DNA GENITAL QL NAA+PROBE: NEGATIVE
HPV HR GENOTYPES PNL CVX NAA+PROBE: NEGATIVE
HPV16 DNA SPEC QL NAA+PROBE: NEGATIVE
HPV18 DNA SPEC QL NAA+PROBE: NEGATIVE
LAB AP HPV GENOTYPE QUESTION: YES
LAB AP HPV HR: NORMAL
LABORATORY COMMENT REPORT: NORMAL
PATH REPORT.TOTAL CANCER: NORMAL

## 2024-04-01 ENCOUNTER — TELEPHONE (OUTPATIENT)
Dept: PRIMARY CARE | Facility: CLINIC | Age: 44
End: 2024-04-01
Payer: COMMERCIAL

## 2024-04-01 NOTE — TELEPHONE ENCOUNTER
Pt of Dr Shah's. Called in regards to her labs. She is aware of her results but is asking specifically in regards to her CBC panel. Please advise. Thank you!

## 2024-04-03 ENCOUNTER — HOSPITAL ENCOUNTER (OUTPATIENT)
Dept: RADIOLOGY | Facility: CLINIC | Age: 44
Discharge: HOME | End: 2024-04-03
Payer: COMMERCIAL

## 2024-04-03 ENCOUNTER — OFFICE VISIT (OUTPATIENT)
Dept: ORTHOPEDIC SURGERY | Facility: CLINIC | Age: 44
End: 2024-04-03
Payer: COMMERCIAL

## 2024-04-03 DIAGNOSIS — M25.519 CHRONIC SHOULDER PAIN, UNSPECIFIED LATERALITY: ICD-10-CM

## 2024-04-03 DIAGNOSIS — M75.41 ROTATOR CUFF IMPINGEMENT SYNDROME, RIGHT: Primary | ICD-10-CM

## 2024-04-03 DIAGNOSIS — G89.29 CHRONIC SHOULDER PAIN, UNSPECIFIED LATERALITY: ICD-10-CM

## 2024-04-03 PROCEDURE — 73030 X-RAY EXAM OF SHOULDER: CPT | Mod: RT

## 2024-04-03 PROCEDURE — 73030 X-RAY EXAM OF SHOULDER: CPT | Mod: RIGHT SIDE | Performed by: STUDENT IN AN ORGANIZED HEALTH CARE EDUCATION/TRAINING PROGRAM

## 2024-04-03 PROCEDURE — 99203 OFFICE O/P NEW LOW 30 MIN: CPT | Performed by: ORTHOPAEDIC SURGERY

## 2024-04-03 PROCEDURE — 99213 OFFICE O/P EST LOW 20 MIN: CPT | Performed by: ORTHOPAEDIC SURGERY

## 2024-04-03 RX ORDER — MELOXICAM 15 MG/1
15 TABLET ORAL DAILY
Qty: 30 TABLET | Refills: 11 | Status: SHIPPED | OUTPATIENT
Start: 2024-04-03 | End: 2025-04-03

## 2024-04-03 NOTE — PROGRESS NOTES
History of Present Illness:  Patient presents today endorsing right shoulder pain.  The pain is worse with overhead activity and tends to wake the patient at night.  The patient denies a traumatic injury.  The pain is sharp in nature, localizes lateral and deep.  Better with rest.    She does yoga including a class of multiple push-ups.    Past Medical History:   Diagnosis Date    Abnormal TSH 04/24/2023    Anemia     Colitis 05/25/2017    Diverticulitis large intestine 10/26/2018     Past Surgical History:   Procedure Laterality Date    OTHER SURGICAL HISTORY  03/08/2021    Colonoscopy       Current Outpatient Medications:     cyanocobalamin (Vitamin B-12) 250 mcg tablet, Take 1 tablet (250 mcg) by mouth once daily., Disp: , Rfl:     ferrous sulfate 325 (65 Fe) MG tablet, Take by mouth., Disp: , Rfl:     meloxicam (Mobic) 15 mg tablet, Take 1 tablet (15 mg) by mouth once daily., Disp: 30 tablet, Rfl: 11    multivitamin (Multiple Vitamins) tablet, Take by mouth., Disp: , Rfl:     Review of Systems   GENERAL: Negative for malaise, significant weight loss, fever  MUSCULOSKELETAL: see HPI  NEURO:  Negative    Physical Examination:  Right Shoulder:    Skin healthy to gross inspection  No ecchymosis, no swelling, no gross atrophy  No tenderness to palpation over acromioclavicular joint  No tenderness to palpation over biceps tendon  No tenderness to palpation over the cervical spine     Range of motion:  Full forward flexion  Full external rotation  IR to thoracic spine, symmetric to contralateral side  Strength:  4+/5 Resisted elevation  5/5 Resisted external rotation    Negative lift off test   Negative Spurling´s test  Positive Neer and Hawkin´s test  Neurovascular exam normal distally    Imaging:  Radiographs demonstrate healthy joint spaces with no evidence of significant degenerative changes or fractures    Assessment:  Patient with right shoulder pain, impingement versus rotator cuff pathology      Plan:  We  discussed external impingement - reviewing acromial morphology and rotator cuff pathology.  The possibility of a partial or full-thickness or partial rotator cuff tear was discussed.  We discussed non-operative treatments (physical therapy, NSAIDs/risks, corticosteroid injections, and activity medication) and operative treatments (shoulder arthroscopy, acromioplasty, associated interventions, risks and benefits).  The patient elected for PT, NSAID - mri if not improved.    A nonsteroidal anti-inflammatory drug (NSAID) was prescribed.  We reviewed the risks (including gastric issues, renal issues) and benefits of the medication.  We discussed a scheduled course if no adverse reactions to help with swelling / pain.  We discussed that chronic usage should be checked with primary care physician.

## 2024-04-12 ENCOUNTER — HOSPITAL ENCOUNTER (OUTPATIENT)
Dept: RADIOLOGY | Facility: CLINIC | Age: 44
Discharge: HOME | End: 2024-04-12
Payer: COMMERCIAL

## 2024-04-12 VITALS — HEIGHT: 61 IN | WEIGHT: 110 LBS | BODY MASS INDEX: 20.77 KG/M2

## 2024-04-12 DIAGNOSIS — Z12.31 ENCOUNTER FOR SCREENING MAMMOGRAM FOR MALIGNANT NEOPLASM OF BREAST: ICD-10-CM

## 2024-04-12 PROCEDURE — 77067 SCR MAMMO BI INCL CAD: CPT | Performed by: RADIOLOGY

## 2024-04-12 PROCEDURE — 77067 SCR MAMMO BI INCL CAD: CPT

## 2024-04-12 PROCEDURE — 77063 BREAST TOMOSYNTHESIS BI: CPT | Performed by: RADIOLOGY

## 2024-04-16 DIAGNOSIS — R92.8 ABNORMAL MAMMOGRAM OF RIGHT BREAST: Primary | ICD-10-CM

## 2024-04-17 ENCOUNTER — TELEPHONE (OUTPATIENT)
Dept: PRIMARY CARE | Facility: CLINIC | Age: 44
End: 2024-04-17
Payer: COMMERCIAL

## 2024-04-18 ENCOUNTER — HOSPITAL ENCOUNTER (OUTPATIENT)
Dept: RADIOLOGY | Facility: CLINIC | Age: 44
Discharge: HOME | End: 2024-04-18
Payer: COMMERCIAL

## 2024-04-18 DIAGNOSIS — R92.8 ABNORMAL MAMMOGRAM OF RIGHT BREAST: ICD-10-CM

## 2024-04-18 PROCEDURE — 77065 DX MAMMO INCL CAD UNI: CPT | Mod: RT

## 2024-04-18 PROCEDURE — 77065 DX MAMMO INCL CAD UNI: CPT | Mod: RIGHT SIDE | Performed by: STUDENT IN AN ORGANIZED HEALTH CARE EDUCATION/TRAINING PROGRAM

## 2024-04-18 PROCEDURE — 77061 BREAST TOMOSYNTHESIS UNI: CPT | Mod: RIGHT SIDE | Performed by: STUDENT IN AN ORGANIZED HEALTH CARE EDUCATION/TRAINING PROGRAM

## 2024-04-22 ENCOUNTER — OFFICE VISIT (OUTPATIENT)
Dept: PRIMARY CARE | Facility: CLINIC | Age: 44
End: 2024-04-22
Payer: COMMERCIAL

## 2024-04-22 VITALS
WEIGHT: 114.8 LBS | SYSTOLIC BLOOD PRESSURE: 98 MMHG | HEIGHT: 60 IN | BODY MASS INDEX: 22.54 KG/M2 | HEART RATE: 55 BPM | OXYGEN SATURATION: 98 % | DIASTOLIC BLOOD PRESSURE: 60 MMHG

## 2024-04-22 DIAGNOSIS — L02.91 ABSCESS: Primary | ICD-10-CM

## 2024-04-22 DIAGNOSIS — R19.09 RIGHT GROIN MASS: ICD-10-CM

## 2024-04-22 DIAGNOSIS — E53.8 B12 DEFICIENCY: Chronic | ICD-10-CM

## 2024-04-22 PROCEDURE — 99214 OFFICE O/P EST MOD 30 MIN: CPT | Performed by: NURSE PRACTITIONER

## 2024-04-22 RX ORDER — CLINDAMYCIN PHOSPHATE 10 UG/ML
LOTION TOPICAL 2 TIMES DAILY
Qty: 60 ML | Refills: 0 | Status: SHIPPED | OUTPATIENT
Start: 2024-04-22 | End: 2024-05-06

## 2024-04-22 ASSESSMENT — PATIENT HEALTH QUESTIONNAIRE - PHQ9
SUM OF ALL RESPONSES TO PHQ9 QUESTIONS 1 AND 2: 0
1. LITTLE INTEREST OR PLEASURE IN DOING THINGS: NOT AT ALL
2. FEELING DOWN, DEPRESSED OR HOPELESS: NOT AT ALL

## 2024-04-22 NOTE — ASSESSMENT & PLAN NOTE
Not currently drainable.  No central fluctuance.  Advised warm compresses, avoid shaving the area, topical clindamycin. If gets larger will send for I&D.

## 2024-04-22 NOTE — PROGRESS NOTES
"Subjective   Patient ID: Radha Harris is a 44 y.o. female who presents for Cyst (Located in private area. Patient states she noticed it last week. Patient denies any pain. ).    Noticed a lump on the right side of groin. First noticed last week.  She tried to squeeze it but not able to get anything out. Became angry red and swollen.  She has an associated \"ball on side of the vagina\" She states she has had this for years and has not changed. She states this is on the right side of the vaginal opening. Hard firm lump.    She feels chronically fatigued. Saw hem in the past for anemia. No cause found. Takes Iron and Vit b12.         Review of Systems  otherwise negative aside from what was mentioned above in HPI.    Objective   BP 98/60 (BP Location: Right arm, Patient Position: Sitting)   Pulse 55   Ht 1.524 m (5')   Wt 52.1 kg (114 lb 12.8 oz)   SpO2 98%   BMI 22.42 kg/m²     Physical Exam  Constitutional:       Appearance: Normal appearance.   Cardiovascular:      Rate and Rhythm: Normal rate and regular rhythm.   Pulmonary:      Effort: Pulmonary effort is normal.      Breath sounds: Normal breath sounds.   Abdominal:      General: Abdomen is flat.   Genitourinary:     Labia:         Right: No lesion or injury.         Left: No lesion or injury.           Comments: Mass to right labia. Firm smooth round freely mobile mass.  Neurological:      Mental Status: She is alert.   Psychiatric:         Mood and Affect: Mood normal.         Behavior: Behavior normal.         Thought Content: Thought content normal.         Judgment: Judgment normal.         Assessment/Plan   Problem List Items Addressed This Visit             ICD-10-CM    B12 deficiency (Chronic) E53.8     Controlled on B12 supplements         Abscess - Primary (Chronic) L02.91     Not currently drainable.  No central fluctuance.  Advised warm compresses, avoid shaving the area, topical clindamycin. If gets larger will send for I&D.         Relevant " Medications    clindamycin (Cleocin T) 1 % lotion    Other Relevant Orders    US pelvis limited    Right groin mass (Chronic) R19.09     Advised US to rule out small tumor.  Follow up with GYN pending results.  Unrelated to groin abscess          Relevant Orders    US pelvis limited

## 2024-04-22 NOTE — ASSESSMENT & PLAN NOTE
Advised US to rule out small tumor.  Follow up with GYN pending results.  Unrelated to groin abscess

## 2024-04-25 ENCOUNTER — APPOINTMENT (OUTPATIENT)
Dept: PRIMARY CARE | Facility: CLINIC | Age: 44
End: 2024-04-25
Payer: COMMERCIAL

## 2024-05-02 ENCOUNTER — HOSPITAL ENCOUNTER (OUTPATIENT)
Dept: RADIOLOGY | Facility: CLINIC | Age: 44
Discharge: HOME | End: 2024-05-02
Payer: COMMERCIAL

## 2024-05-02 DIAGNOSIS — R19.09 RIGHT GROIN MASS: ICD-10-CM

## 2024-05-02 DIAGNOSIS — L02.91 ABSCESS: ICD-10-CM

## 2024-05-02 PROCEDURE — 76857 US EXAM PELVIC LIMITED: CPT

## 2024-05-02 PROCEDURE — 76857 US EXAM PELVIC LIMITED: CPT | Performed by: STUDENT IN AN ORGANIZED HEALTH CARE EDUCATION/TRAINING PROGRAM

## 2024-05-06 ENCOUNTER — EVALUATION (OUTPATIENT)
Dept: PHYSICAL THERAPY | Facility: CLINIC | Age: 44
End: 2024-05-06
Payer: COMMERCIAL

## 2024-05-06 DIAGNOSIS — M25.511 RIGHT SHOULDER PAIN: Primary | ICD-10-CM

## 2024-05-06 DIAGNOSIS — M75.41 ROTATOR CUFF IMPINGEMENT SYNDROME, RIGHT: ICD-10-CM

## 2024-05-06 PROCEDURE — 97161 PT EVAL LOW COMPLEX 20 MIN: CPT | Mod: GP

## 2024-05-06 PROCEDURE — 97110 THERAPEUTIC EXERCISES: CPT | Mod: GP

## 2024-05-06 ASSESSMENT — PAIN - FUNCTIONAL ASSESSMENT: PAIN_FUNCTIONAL_ASSESSMENT: 0-10

## 2024-05-06 ASSESSMENT — PAIN SCALES - GENERAL: PAINLEVEL_OUTOF10: 0 - NO PAIN

## 2024-05-06 NOTE — PROGRESS NOTES
Physical Therapy    Physical Therapy Evaluation and Treatment      Patient Name: Radha Harris Prefers to go by Inocencia  MRN: [id number]  Today's Date: 5/6/2024  Time Calculation  Start Time: 1015  Stop Time: 1056  Time Calculation (min): 41 min  Visit Number: 1  Approved Visits: 60  Auth required: No    Assessment:  Patient is a 44 year old female who is presenting with gradual onset of right shoulder pain. She presents today with impaired posture (forward head and rounded shoulders), pain with shoulder ROM, generalized decreased periscapular strength, and discomfort in the posterior lateral region of the right shoulder. These impairments have limited the patients ability to participate in yoga workouts, prolonged running, overhead strength training, and ADLs (dressing and showering). These sxs are consistent with probable rotator cuff-related shoulder pain which results with pain with overhead activities, decreased periscapular strength, and limitations in functional activities. Patient requires skilled PT intervention to address these functional limitations, impaired posture, pain with shoulder ROM, and decreased periscapular strength. Patient is motivated to reduce pain levels to allow her to complete her functional activities and return her to completing yoga workouts, prolonged runs, upper body strength training, and ADLs without reproduction of her pain.  PT Assessment  Rehab Prognosis: Good  Evaluation/Treatment Tolerance: Patient tolerated treatment well     Plan:  OP PT Plan  Treatment/Interventions: Blood flow restriction therapy, Dry needling, Education/ Instruction, Electrical stimulation, Manual therapy, Neuromuscular re-education, Self care/ home management, Therapeutic activities, Therapeutic exercises  PT Plan: Skilled PT  PT Frequency: 1 time per week  Duration: 6-8 weeks  Number of Treatments Authorized: 60  Rehab Potential: Good  Plan of Care Agreement: Patient    Current Problem:   1. Right  "shoulder pain        2. Rotator cuff impingement syndrome, right  Referral to Physical Therapy          Subjective    Chief complaint: Patient presents with onset of right shoulder pain that occurred three months ago. She reports that the pain occurred gradually and she noticed it increased after sleeping on her right side, completing upper body strength training workouts, prolonged running, hot yoga workouts, and completing ADLs (dressing and showering). Due to the pain she has limited her physical activity and begun gentle yoga to reduce increasing the pain. She notes that gentle ROM, ice, and taking meloxicam has provided her some relief. The pain is aggravated with rotation motions of the shoulder and overhead activities, but once she gets out of those ranges the pain decreases. She reports no pain distal to the elbow or numbness or tingling. She reports her pain is dull and achy and does not report sharp pain. She works as an  that requires prolonged sitting. She wants to return to overhead activities, sleeping comfortably, running, ADLs, and yoga.  PMHx: Patellofemoral syndrome, pilca syndrome   : verified with patient  PLOF: Independent without restrictions  Medications: see chart for full medication list   Patient goals for PT: Return her to ADLs, exercise, and yoga without reproduction of pain  Medical Screening: Patient denies recent infection/illness, headaches, chest pain, SOB,   \"Inocencia\"  General  Reason for Referral: Right shoulder rotator cuff impingement  Referred By: Dr. Sahu  Precautions:  Precautions  Precautions Comment: No increased fall risk    Pain:  Pain Assessment  Pain Assessment: 0-10  Pain Score: 0 - No pain (At it's worst can get up to a 6/10.)      Objective   Cervical ROM:    Flexion: WNL -- pain with overpressure    Extension: WNL -- pain with overpressure    Rotation: WNL   SB: WNL  Neuro Exam:   Dermatome exam: Not tested at this date   Myotome exam: WNL   Reflexes: Not " tested at this date  Shoulder ROM:    Flexion: R: 171 with pain L: 179    Abduction: R: 175 with pain L: 178   ER(90/90): R: 65 with pain L: 80   IR(90/90): R: 90 with pain L: 90   ER (functional): Symmetrical bilaterally with pain on the R   IR (functional): Limited motion on the R with pain     Horizonal Abduction (functional): WNL -- with pain on R    Horizontal Adduction (functional): WNL   MMT:   Flexion: R: 4/5 with pain L: 4/5    Abduction: R: 4/5 with pain L: 4/5    ER (0*): R: 4/5 with pain L: 4/5   IR (0*): R: 4/5 with pain L: 4/5  Special Test:   Lateral Kellie: (+) on the right   Observation: Forward head and rounded shoulders  Palpation: Discomfort in the posterior lateral region and inferior boarder of the scapula slighter higher on the R    Outcome Measures:  Other Measures  Disability of Arm Shoulder Hand (DASH): 20     Treatments:  Wall slides  Pull aparts w/ RB  Seated scap retractions  ER w/ RB    EDUCATION:  Outpatient Education  Individual(s) Educated: Patient  Education Provided: Home Exercise Program  Risk and Benefits Discussed with Patient/Caregiver/Other: yes  Patient/Caregiver Demonstrated Understanding: yes  Plan of Care Discussed and Agreed Upon: yes  Patient Response to Education: Patient/Caregiver Verbalized Understanding of Information  Education Comment: Reviewed HEP    Goals:  Patient will demonstrate proper performance and understanding of her HEP of active strengthening and mobility exercises to demonstrate her motivation to return to her prior level of no pain.   Patient will increase her overall  periscapular strength from 4/5 to 5/5 to improve her overall strength and reduce her pain levels.   Patient will demonstrate the ability to move through shoulder ROM without reproduction of pain to improve her mobility and return her to prior level of functioning.  Patient will report the ability to complete a 5 mile run without reproduction of pain to reduce her pain levels and return  her to prior level of functioning.  Patient will report the ability to complete ADLs (dressing and showering) without reproduction of pain to return her to her prior level of functioning and reduce her pain levels.  Patient will report not waking up during the night due to pain to improve her sleep quality and return her to her prior level of functioning.

## 2024-05-15 ENCOUNTER — TREATMENT (OUTPATIENT)
Dept: PHYSICAL THERAPY | Facility: CLINIC | Age: 44
End: 2024-05-15
Payer: COMMERCIAL

## 2024-05-15 DIAGNOSIS — M25.511 RIGHT SHOULDER PAIN: Primary | ICD-10-CM

## 2024-05-15 PROCEDURE — 97140 MANUAL THERAPY 1/> REGIONS: CPT | Mod: GP

## 2024-05-15 PROCEDURE — 97110 THERAPEUTIC EXERCISES: CPT | Mod: GP

## 2024-05-15 ASSESSMENT — PAIN - FUNCTIONAL ASSESSMENT: PAIN_FUNCTIONAL_ASSESSMENT: 0-10

## 2024-05-15 ASSESSMENT — PAIN SCALES - GENERAL: PAINLEVEL_OUTOF10: 0 - NO PAIN

## 2024-05-15 NOTE — PROGRESS NOTES
"Physical Therapy    Physical Therapy Evaluation and Treatment      Patient Name: Radha Harris Prefers to go by Inocencia  MRN: [id number]  Today's Date: 5/15/2024  Time Calculation  Start Time: 0910  Stop Time: 0954  Time Calculation (min): 44 min  Visit Number: 1  Approved Visits: 60  Auth required: No    Assessment:   Patient reports that she feels around the same with her pain levels, but does report improvement in her sleep quality. She responded well to GH lateral/posterior glides and STM in posterior parascapular region and reported that she felt relief with these mobilizations and STM. Patient demonstrate good scapular control with SL horizontal abduction and ER walk-outs. She demonstrated moderate fatigue with SL horizontal abduction, but was resolved with rest. She demonstrated tolerable pain with ER walk-outs so she was educated to not work into the pain. To improve thoracic mobility and strength, scapular rows were introduced and she demonstrated good scapular control with this exercise. Overall, she responded well to these exercises suggestion good progression in her POC.    Plan:    Continue functional strengthening and mobility exercises.     Current Problem:   1. Right shoulder pain            Subjective    Patient presents that she feels about the same and reports that dressing is more difficult. She reports that the ER is painful in her HEP. She reports that her sleeping has improved and sleeping on the right side has improved.   \"Inocencia\"     Precautions:  Precautions  Precautions Comment: No increased fall risk    Pain:  Pain Assessment  Pain Assessment: 0-10  Pain Score: 0 - No pain      Objective   Initial Evaluation Objective:  Cervical ROM:    Flexion: WNL -- pain with overpressure    Extension: WNL -- pain with overpressure    Rotation: WNL   SB: WNL  Neuro Exam:   Dermatome exam: Not tested at this date   Myotome exam: WNL   Reflexes: Not tested at this date  Shoulder ROM:    Flexion: R: 171 with " pain L: 179    Abduction: R: 175 with pain L: 178   ER(90/90): R: 65 with pain L: 80   IR(90/90): R: 90 with pain L: 90   ER (functional): Symmetrical bilaterally with pain on the R   IR (functional): Limited motion on the R with pain     Horizonal Abduction (functional): WNL -- with pain on R    Horizontal Adduction (functional): WNL   MMT:   Flexion: R: 4/5 with pain L: 4/5    Abduction: R: 4/5 with pain L: 4/5    ER (0*): R: 4/5 with pain L: 4/5   IR (0*): R: 4/5 with pain L: 4/5  Special Test:   Lateral Kellie: (+) on the right   Observation: Forward head and rounded shoulders  Palpation: Discomfort in the posterior lateral region and inferior boarder of the scapula slighter higher on the R    Outcome Measures:    Not assessed this date.    Treatments:  Manual Therapy:  GH distraction w/ lateral glide  PA mobilization   STM focus on posterior lateral region     Therapeutic exercises:  Supine scaption raise w/ red loop  ABCs  SL horizontal abduction w/ 1 lb. DB  IR isometric 2x8   ER walkout 2x10  Lateral raises w/ 1 lb. DB 2x10  Scapular row w/ red loop 2x10    EDUCATION:    Reviewed HEP and added ER walkout, scapular rows, and discharged ER pull aparts from her HEP due to increased pain levels.     Goals:  Patient will demonstrate proper performance and understanding of her HEP of active strengthening and mobility exercises to demonstrate her motivation to return to her prior level of no pain.   Patient will increase her overall  periscapular strength from 4/5 to 5/5 to improve her overall strength and reduce her pain levels.   Patient will demonstrate the ability to move through shoulder ROM without reproduction of pain to improve her mobility and return her to prior level of functioning.  Patient will report the ability to complete a 5 mile run without reproduction of pain to reduce her pain levels and return her to prior level of functioning.  Patient will report the ability to complete ADLs (dressing and  showering) without reproduction of pain to return her to her prior level of functioning and reduce her pain levels.  Patient will report not waking up during the night due to pain to improve her sleep quality and return her to her prior level of functioning.

## 2024-05-20 ENCOUNTER — APPOINTMENT (OUTPATIENT)
Dept: PHYSICAL THERAPY | Facility: CLINIC | Age: 44
End: 2024-05-20
Payer: COMMERCIAL

## 2024-06-04 ENCOUNTER — TREATMENT (OUTPATIENT)
Dept: PHYSICAL THERAPY | Facility: CLINIC | Age: 44
End: 2024-06-04
Payer: COMMERCIAL

## 2024-06-04 DIAGNOSIS — M25.511 RIGHT SHOULDER PAIN: Primary | ICD-10-CM

## 2024-06-04 DIAGNOSIS — M25.562 LEFT KNEE PAIN: ICD-10-CM

## 2024-06-04 PROCEDURE — 97140 MANUAL THERAPY 1/> REGIONS: CPT | Mod: GP

## 2024-06-04 PROCEDURE — 97110 THERAPEUTIC EXERCISES: CPT | Mod: GP

## 2024-06-04 ASSESSMENT — PAIN SCALES - GENERAL: PAINLEVEL_OUTOF10: 4

## 2024-06-04 ASSESSMENT — PAIN - FUNCTIONAL ASSESSMENT: PAIN_FUNCTIONAL_ASSESSMENT: 0-10

## 2024-06-04 NOTE — PROGRESS NOTES
Physical Therapy    Physical Therapy Evaluation and Treatment      Patient Name: Radha Harris   MRN: [id number]  Today's Date: 6/4/2024  Time Calculation  Start Time: 1447  Stop Time: 1534  Time Calculation (min): 47 min  Visit Number: 3  Approved Visits: 60  Auth required: No    Assessment:   Patient reports that she is feeling better and has begun light strength training, yoga, and running. She demonstrated good response to the manual techniques and noted that she felt good after them. She demonstrated good control with supine press up w/ RB, side lying ER and horizontal abduction, TRX rows, and lateral raises. Increased resistance was added to the supine press up and lateral raises and she responded well to these suggesting good progression in her POC. We assessed her left knee due to her complaint of left knee pain. She demonstrated TTP in the medial region, reduced knee and hip strength, and knee valgus with SL squat and anterior step down. To improve her knee strength, FWD step downs, supine SLR, and SL glute bridge were completed. She required cueing to reduce knee valgus and she was able to correct this with visual feedback and cueing to keep knee over toe. We educated her on modifying her running if her pain levels increase/linger. Overall, she responded well to today sessions.    Plan:    Continue functional strengthening and mobility exercises.     Current Problem:   1. Right shoulder pain        2. Left knee pain            Subjective    Patient reports that she has started doing yoga and lifting light weights. She notes that there is still pain with donning and doffing her clothes. She reports that she ices each morning when the pain occurs. Patient reports pain in her left knee that occurs with running. She reports that the pain is dull and achy. She notes that no other activities aggravate her pain and notes that wearing a knee brace and rest has helped to alleviate her pain. She notes that she feels  "the pain immediately when she begins to run and the pain lingers usually within an hour after running.  \"Inocencia\"     Precautions:  Precautions  Precautions Comment: No increased fall risk    Pain:  Pain Assessment  Pain Assessment: 0-10  Pain Score: 4    Objective   06/04/2024 Objective:  TTP medial left knee   Patellar mobilization WNL in inferior/superior/medial/lateral   Knee MMT:   Flexion: R: 4/5 L: 4/5   Extension: R: 5/5 L: 4/5 -- noticed some discomfort with the L  Hip MMT:    Supine flexion: R: 5/5 L: 5/5   Abduction: R: 4/5 L: 4/5   Prone extension: R: 4/5 L: 4/5  DL squat: WNL  SL squat R: Slight knee valgus and pelvic drop L: Knee valgus  Anterior step down: R: Slight knee valgus L: Knee valgus       Initial Evaluation Objective:  Cervical ROM:    Flexion: WNL -- pain with overpressure    Extension: WNL -- pain with overpressure    Rotation: WNL   SB: WNL  Neuro Exam:   Dermatome exam: Not tested at this date   Myotome exam: WNL   Reflexes: Not tested at this date  Shoulder ROM:    Flexion: R: 171 with pain L: 179    Abduction: R: 175 with pain L: 178   ER(90/90): R: 65 with pain L: 80   IR(90/90): R: 90 with pain L: 90   ER (functional): Symmetrical bilaterally with pain on the R   IR (functional): Limited motion on the R with pain     Horizonal Abduction (functional): WNL -- with pain on R    Horizontal Adduction (functional): WNL   MMT:   Flexion: R: 4/5 with pain L: 4/5    Abduction: R: 4/5 with pain L: 4/5    ER (0*): R: 4/5 with pain L: 4/5   IR (0*): R: 4/5 with pain L: 4/5  Special Test:   Lateral Kellie: (+) on the right   Observation: Forward head and rounded shoulders  Palpation: Discomfort in the posterior lateral region and inferior boarder of the scapula slighter higher on the R    Outcome Measures:    Not assessed this date.    Treatments:  Manual Therapy:  GH distraction w/ lateral glide  PA mobilization   STM focus on posterior lateral region     Therapeutic exercises:  Supine scaption press " up w/ blue loop  SL horizontal abduction w/ 1 lb. DB  SL ER w/ 1 lb. DB  Lateral raises w/ 2 lb. DB  TRX Rows   FWD step downs   Supine SLR   SL glute bridge     EDUCATION:    Reviewed HEP and added FWD step downs, supine SLR, and SL glute bridge to her HEP.    Goals:  Patient will demonstrate proper performance and understanding of her HEP of active strengthening and mobility exercises to demonstrate her motivation to return to her prior level of no pain.   Patient will increase her overall  periscapular strength from 4/5 to 5/5 to improve her overall strength and reduce her pain levels.   Patient will demonstrate the ability to move through shoulder ROM without reproduction of pain to improve her mobility and return her to prior level of functioning.  Patient will report the ability to complete a 5 mile run without reproduction of pain to reduce her pain levels and return her to prior level of functioning.  Patient will report the ability to complete ADLs (dressing and showering) without reproduction of pain to return her to her prior level of functioning and reduce her pain levels.  Patient will report not waking up during the night due to pain to improve her sleep quality and return her to her prior level of functioning.    Updated goals 6/4/2024:  Patient will demonstrate the ability to complete 10 SL squats without reproduction of pain or compensations to return her to PLOF.  Patient will demonstrate the ability to run >1 mile without reproduction of pain or compensations to return her to PLOF.   Patient will increase her overall knee and hip strength from 4/5 to 5/5 to improve her overall strength and reduce her pain levels.

## 2024-06-10 ENCOUNTER — APPOINTMENT (OUTPATIENT)
Dept: PHYSICAL THERAPY | Facility: CLINIC | Age: 44
End: 2024-06-10
Payer: COMMERCIAL

## 2024-07-08 ENCOUNTER — APPOINTMENT (OUTPATIENT)
Dept: PHYSICAL THERAPY | Facility: CLINIC | Age: 44
End: 2024-07-08
Payer: COMMERCIAL

## 2024-08-23 ENCOUNTER — HOSPITAL ENCOUNTER (OUTPATIENT)
Dept: RADIOLOGY | Facility: CLINIC | Age: 44
Discharge: HOME | End: 2024-08-23
Payer: COMMERCIAL

## 2024-08-23 ENCOUNTER — OFFICE VISIT (OUTPATIENT)
Dept: PRIMARY CARE | Facility: CLINIC | Age: 44
End: 2024-08-23
Payer: COMMERCIAL

## 2024-08-23 VITALS
HEART RATE: 62 BPM | OXYGEN SATURATION: 97 % | RESPIRATION RATE: 16 BRPM | DIASTOLIC BLOOD PRESSURE: 70 MMHG | WEIGHT: 106.6 LBS | HEIGHT: 60 IN | TEMPERATURE: 98.3 F | SYSTOLIC BLOOD PRESSURE: 103 MMHG | BODY MASS INDEX: 20.93 KG/M2

## 2024-08-23 DIAGNOSIS — R53.83 OTHER FATIGUE: ICD-10-CM

## 2024-08-23 DIAGNOSIS — M25.552 PAIN OF LEFT HIP: ICD-10-CM

## 2024-08-23 DIAGNOSIS — M25.552 PAIN OF LEFT HIP: Primary | ICD-10-CM

## 2024-08-23 DIAGNOSIS — M67.441 GANGLION CYST OF FLEXOR TENDON SHEATH OF FINGER OF RIGHT HAND: ICD-10-CM

## 2024-08-23 DIAGNOSIS — R53.83 OTHER FATIGUE: Primary | ICD-10-CM

## 2024-08-23 DIAGNOSIS — Z71.84 TRAVEL ADVICE ENCOUNTER: ICD-10-CM

## 2024-08-23 PROCEDURE — 73502 X-RAY EXAM HIP UNI 2-3 VIEWS: CPT | Mod: LT

## 2024-08-23 PROCEDURE — 3008F BODY MASS INDEX DOCD: CPT | Performed by: INTERNAL MEDICINE

## 2024-08-23 PROCEDURE — 1036F TOBACCO NON-USER: CPT | Performed by: INTERNAL MEDICINE

## 2024-08-23 PROCEDURE — 99214 OFFICE O/P EST MOD 30 MIN: CPT | Performed by: INTERNAL MEDICINE

## 2024-08-23 RX ORDER — BLOOD-GLUCOSE CONTROL, NORMAL
EACH MISCELLANEOUS
Qty: 25 EACH | Refills: 0 | Status: SHIPPED | OUTPATIENT
Start: 2024-08-23

## 2024-08-23 RX ORDER — LANCETS 26 GAUGE
EACH MISCELLANEOUS
Qty: 1 EACH | Refills: 0 | Status: SHIPPED | OUTPATIENT
Start: 2024-08-23 | End: 2025-08-23

## 2024-08-23 RX ORDER — AZITHROMYCIN 250 MG/1
TABLET, FILM COATED ORAL
Qty: 6 TABLET | Refills: 0 | Status: SHIPPED | OUTPATIENT
Start: 2024-08-23 | End: 2024-08-28

## 2024-08-23 RX ORDER — LANCETS
EACH MISCELLANEOUS
Qty: 25 EACH | Refills: 0 | Status: SHIPPED | OUTPATIENT
Start: 2024-08-23 | End: 2024-08-23

## 2024-08-23 RX ORDER — DEXTROSE 4 G
TABLET,CHEWABLE ORAL
Qty: 1 EACH | Refills: 0 | Status: SHIPPED | OUTPATIENT
Start: 2024-08-23

## 2024-08-23 RX ORDER — BLOOD SUGAR DIAGNOSTIC
STRIP MISCELLANEOUS
Qty: 25 STRIP | Refills: 0 | Status: SHIPPED | OUTPATIENT
Start: 2024-08-23

## 2024-08-23 ASSESSMENT — PATIENT HEALTH QUESTIONNAIRE - PHQ9
2. FEELING DOWN, DEPRESSED OR HOPELESS: NOT AT ALL
1. LITTLE INTEREST OR PLEASURE IN DOING THINGS: NOT AT ALL
SUM OF ALL RESPONSES TO PHQ9 QUESTIONS 1 AND 2: 0

## 2024-08-23 NOTE — PROGRESS NOTES
Subjective   Patient ID: Radha Harris is a 44 y.o. female who presents for Hand Pain (Right ring finger) and Hip Pain (Left side).    HPI     Here for a few concerns  Would like antibiotic for travel. To go to [facility] in December  She has had left hip pain for some time. She states sore on the bone. She states started after shoulder issue and from laying on that side. States sore to touch and after running. Ran 2 miles yesterday without issues.   Tired after eating and needs a nap  Has lump on ring finger of right hand. Does not bother her. Not getting bigger    Review of Systems   All other systems reviewed and are negative.      Objective   /70 (BP Location: Left arm, Patient Position: Sitting, BP Cuff Size: Adult)   Pulse 62   Temp 36.8 °C (98.3 °F)   Resp 16   Ht 1.524 m (5')   Wt 48.4 kg (106 lb 9.6 oz)   SpO2 97%   BMI 20.82 kg/m²     Physical Exam  Constitutional:       Appearance: Normal appearance.   Cardiovascular:      Rate and Rhythm: Normal rate and regular rhythm.      Heart sounds: Normal heart sounds. No murmur heard.     No gallop.   Pulmonary:      Effort: Pulmonary effort is normal. No respiratory distress.      Breath sounds: Normal breath sounds.   Musculoskeletal:      Right lower leg: No edema.      Left lower leg: No edema.      Comments: Left hip- ttp over iliac crest. Neg LENY  Right hand-along ring finer- small mobile cyst   Neurological:      Mental Status: She is alert.         Assessment/Plan   Problem List Items Addressed This Visit    None  Visit Diagnoses         Codes    Pain of left hip    -  Primary M25.552    Relevant Orders    XR hip left with pelvis when performed 2 or 3 views    Travel advice encounter     Z71.84    Relevant Medications    azithromycin (Zithromax) 250 mg tablet    Other fatigue     R53.83    Relevant Medications    blood sugar diagnostic (Accu-Chek Guide test strips) strip    lancets misc    blood-glucose meter (Accu-Chek Guide Glucose  Meter) misc    Ganglion cyst of flexor tendon sheath of finger of right hand     M67.441             Left hip pain-send for xray  =discussed could do medrol dose leda-declined  -if no better- return to Dr. Pedroza    Ganglion cyst-if does not bother her-just monitor  -if getting bigger or pain-send to ortho hand    Sent zpak for travel    Fatigue after eating-check for postprandial hypoglycemia

## 2024-11-27 ENCOUNTER — TELEPHONE (OUTPATIENT)
Dept: PRIMARY CARE | Facility: CLINIC | Age: 44
End: 2024-11-27

## 2024-11-27 ENCOUNTER — CLINICAL SUPPORT (OUTPATIENT)
Dept: PRIMARY CARE | Facility: CLINIC | Age: 44
End: 2024-11-27
Payer: COMMERCIAL

## 2024-11-27 DIAGNOSIS — R39.9 UTI SYMPTOMS: Primary | ICD-10-CM

## 2024-11-27 DIAGNOSIS — R39.9 UTI SYMPTOMS: ICD-10-CM

## 2024-11-27 LAB
APPEARANCE UR: CLEAR
BILIRUB UR STRIP.AUTO-MCNC: NEGATIVE MG/DL
COLOR UR: COLORLESS
GLUCOSE UR STRIP.AUTO-MCNC: NORMAL MG/DL
KETONES UR STRIP.AUTO-MCNC: NEGATIVE MG/DL
LEUKOCYTE ESTERASE UR QL STRIP.AUTO: ABNORMAL
MUCOUS THREADS #/AREA URNS AUTO: ABNORMAL /LPF
NITRITE UR QL STRIP.AUTO: NEGATIVE
PH UR STRIP.AUTO: 7 [PH]
PROT UR STRIP.AUTO-MCNC: NEGATIVE MG/DL
RBC # UR STRIP.AUTO: NEGATIVE /UL
RBC #/AREA URNS AUTO: ABNORMAL /HPF
SP GR UR STRIP.AUTO: 1.01
SQUAMOUS #/AREA URNS AUTO: ABNORMAL /HPF
UROBILINOGEN UR STRIP.AUTO-MCNC: NORMAL MG/DL
WBC #/AREA URNS AUTO: ABNORMAL /HPF

## 2024-11-27 PROCEDURE — 81001 URINALYSIS AUTO W/SCOPE: CPT

## 2024-11-27 PROCEDURE — 87077 CULTURE AEROBIC IDENTIFY: CPT

## 2024-11-27 PROCEDURE — 87086 URINE CULTURE/COLONY COUNT: CPT

## 2024-11-27 RX ORDER — NITROFURANTOIN 25; 75 MG/1; MG/1
100 CAPSULE ORAL 2 TIMES DAILY
Qty: 10 CAPSULE | Refills: 0 | Status: SHIPPED | OUTPATIENT
Start: 2024-11-27 | End: 2024-12-02

## 2024-11-27 NOTE — TELEPHONE ENCOUNTER
Pt called starting poss UTI, stated started yesterday, but was drinking a lot of water. Today she has the burning. Please call and advise.  Pt asked if she could be put on an antibiotic or if there is an over the counter medication?    Pharmacy=  [facility] in [place]    Please call pt at  [phone]

## 2024-11-29 LAB — BACTERIA UR CULT: ABNORMAL

## 2025-02-26 ENCOUNTER — OFFICE VISIT (OUTPATIENT)
Dept: PRIMARY CARE | Facility: CLINIC | Age: 45
End: 2025-02-26
Payer: COMMERCIAL

## 2025-02-26 VITALS
BODY MASS INDEX: 21.4 KG/M2 | HEART RATE: 82 BPM | HEIGHT: 60 IN | DIASTOLIC BLOOD PRESSURE: 70 MMHG | WEIGHT: 109 LBS | SYSTOLIC BLOOD PRESSURE: 102 MMHG | OXYGEN SATURATION: 98 %

## 2025-02-26 DIAGNOSIS — M22.2X2 PATELLOFEMORAL SYNDROME, LEFT: Primary | ICD-10-CM

## 2025-02-26 PROCEDURE — 3008F BODY MASS INDEX DOCD: CPT | Performed by: INTERNAL MEDICINE

## 2025-02-26 PROCEDURE — 99213 OFFICE O/P EST LOW 20 MIN: CPT | Performed by: INTERNAL MEDICINE

## 2025-02-26 PROCEDURE — 1036F TOBACCO NON-USER: CPT | Performed by: INTERNAL MEDICINE

## 2025-02-26 ASSESSMENT — PATIENT HEALTH QUESTIONNAIRE - PHQ9
SUM OF ALL RESPONSES TO PHQ9 QUESTIONS 1 AND 2: 0
2. FEELING DOWN, DEPRESSED OR HOPELESS: NOT AT ALL
1. LITTLE INTEREST OR PLEASURE IN DOING THINGS: NOT AT ALL

## 2025-02-26 NOTE — PROGRESS NOTES
Subjective   Patient ID: Radha Harris is a 44 y.o. female who presents for Knee Injury (Left Knee /States that this is an injury from about 2-3 years ago /States that when she goes to get up from sitting from long period of times she gets a pain. //Patient requesting a note to work from home instead of going into the office. ).    HPI     Here to discuss left knee pain  States has chronic left knee pain that comes and goes  No injury  States has done PT  When she uses leg stool and keep knee horizontal no pain  Worse at work when sitting for prolonged or standing    Wishes for a note to work from home on some days    She also states no injury but occurred 3 years ago    States nothing triggered it but feels like since going to work it has    Doing exercise  Declines to do PT right now as does not feel like needs it    Review of Systems   All other systems reviewed and are negative.      Objective   /70 (BP Location: Right arm, Patient Position: Sitting, BP Cuff Size: Adult)   Pulse 82   Ht 1.524 m (5')   Wt 49.4 kg (109 lb)   SpO2 98%   BMI 21.29 kg/m²     Physical Exam  Musculoskeletal:      Right knee: Crepitus present. No swelling, deformity or effusion.      Left knee: Crepitus present. No swelling, deformity, effusion, erythema, lacerations or bony tenderness. Decreased range of motion. No LCL laxity, MCL laxity or ACL laxity.Normal alignment, normal meniscus and normal patellar mobility.         Assessment/Plan   Problem List Items Addressed This Visit             ICD-10-CM    Patellofemoral syndrome, left - Primary M22.2X2     Discussed continue PT  Discussed cannot write a note to work from home always but can write when flares  -if wishes for more- may need orthopedics  -discussed strengthening the knee as well

## 2025-02-26 NOTE — LETTER
February 26, 2025     Patient: Radha Harris   YOB: 1980   Date of Visit: 2/26/2025       To Whom It May Concern:    Radha Harris was seen in my clinic on 2/26/2025. She has had chronic left knee pain for a number of years that will flare. When she is sitting for prolonged periods of time, she has more pain. It is under my recommendation when she has a flare, she has the opportunity to work from home to allow her knee to be on a high stool to allow her leg to be horizontal to reduce pain. Please let me know if any questions.         Sincerely,         Bettina Shah MD

## 2025-04-07 ENCOUNTER — OFFICE VISIT (OUTPATIENT)
Age: 45
End: 2025-04-07
Payer: OTHER GOVERNMENT

## 2025-04-07 VITALS
WEIGHT: 107 LBS | OXYGEN SATURATION: 99 % | HEART RATE: 80 BPM | HEIGHT: 61 IN | BODY MASS INDEX: 20.2 KG/M2 | RESPIRATION RATE: 16 BRPM | SYSTOLIC BLOOD PRESSURE: 108 MMHG | DIASTOLIC BLOOD PRESSURE: 60 MMHG

## 2025-04-07 DIAGNOSIS — G44.209 TENSION HEADACHE: ICD-10-CM

## 2025-04-07 DIAGNOSIS — H53.412 VISUAL FIELD SCOTOMA OF LEFT EYE: Primary | ICD-10-CM

## 2025-04-07 PROBLEM — M22.2X2 PATELLOFEMORAL SYNDROME, LEFT: Status: ACTIVE | Noted: 2023-04-24

## 2025-04-07 PROBLEM — M25.562 LEFT KNEE PAIN: Status: ACTIVE | Noted: 2024-06-04

## 2025-04-07 PROBLEM — M67.52 PLICA SYNDROME, LEFT KNEE: Status: ACTIVE | Noted: 2023-04-24

## 2025-04-07 PROBLEM — D56.3 BETA THALASSEMIA TRAIT: Chronic | Status: ACTIVE | Noted: 2018-10-26

## 2025-04-07 PROBLEM — L30.9 ECZEMA: Status: ACTIVE | Noted: 2023-04-24

## 2025-04-07 PROCEDURE — 99203 OFFICE O/P NEW LOW 30 MIN: CPT | Performed by: PHYSICIAN ASSISTANT

## 2025-04-07 RX ORDER — MULTIVITAMIN
TABLET ORAL
COMMUNITY

## 2025-04-07 SDOH — ECONOMIC STABILITY: FOOD INSECURITY: WITHIN THE PAST 12 MONTHS, YOU WORRIED THAT YOUR FOOD WOULD RUN OUT BEFORE YOU GOT MONEY TO BUY MORE.: NEVER TRUE

## 2025-04-07 SDOH — ECONOMIC STABILITY: FOOD INSECURITY: WITHIN THE PAST 12 MONTHS, THE FOOD YOU BOUGHT JUST DIDN'T LAST AND YOU DIDN'T HAVE MONEY TO GET MORE.: NEVER TRUE

## 2025-04-07 ASSESSMENT — ENCOUNTER SYMPTOMS
ABDOMINAL PAIN: 0
SHORTNESS OF BREATH: 0
PHOTOPHOBIA: 1
EYE REDNESS: 0
VOMITING: 0
NAUSEA: 0
EYE ITCHING: 0
BLOOD IN STOOL: 0
DIARRHEA: 0
EYE PAIN: 0
EYE DISCHARGE: 0
CHEST TIGHTNESS: 0

## 2025-04-07 ASSESSMENT — PATIENT HEALTH QUESTIONNAIRE - PHQ9
2. FEELING DOWN, DEPRESSED OR HOPELESS: NOT AT ALL
1. LITTLE INTEREST OR PLEASURE IN DOING THINGS: NOT AT ALL
SUM OF ALL RESPONSES TO PHQ QUESTIONS 1-9: 0

## 2025-04-07 NOTE — PROGRESS NOTES
Subjective  Ralph Eugene, 44 y.o. female presents today with:  Chief Complaint   Patient presents with    New Patient     Establish care self referral, no current concerns. Previous PCP Dr. Savage        HPI  In the office today to establish care.  Previous PCP: Dr. Savage.    Would like to discuss worsening vision changes in L eye.   No previous history of acute vision changes.   This is made worse by commute to work, LED lights in the office.    Cannot focus when floater comes across her vision.    She had a recent eye exam, retina was normal/health.  No evidence of detachment.     [place] seems to trigger HAs.   Seems to be worse with stress.      Has been working from home, 3 out of 5 days over the past several years.   Requesting to continue this schedule as she was recently asked to return to work 5/5 days in the office.   Visual defect has been made worse with drive and sitting in the office with the computer screens/LED lights.    No dizziness, no changes to MS.    Sleep is good, restful.     Denies worsening anxiety, depression.  Otherwise, very healthy.     Review of Systems   Constitutional:  Negative for appetite change, chills, fatigue, fever and unexpected weight change.   HENT:  Negative for hearing loss and nosebleeds.    Eyes:  Positive for photophobia and visual disturbance. Negative for pain, discharge, redness and itching.   Respiratory:  Negative for chest tightness and shortness of breath.    Cardiovascular:  Negative for chest pain and leg swelling.   Gastrointestinal:  Negative for abdominal pain, blood in stool, diarrhea, nausea and vomiting.   Endocrine: Negative for cold intolerance and heat intolerance.   Genitourinary:  Negative for dysuria, hematuria, menstrual problem and urgency.   Musculoskeletal:  Negative for arthralgias, joint swelling and myalgias.   Skin:  Negative for rash.   Neurological:  Negative for dizziness and headaches.   Psychiatric/Behavioral:  Negative for

## 2025-04-16 ENCOUNTER — TELEPHONE (OUTPATIENT)
Dept: PRIMARY CARE | Facility: CLINIC | Age: 45
End: 2025-04-16

## 2025-04-16 ENCOUNTER — OFFICE VISIT (OUTPATIENT)
Dept: PRIMARY CARE | Facility: CLINIC | Age: 45
End: 2025-04-16
Payer: COMMERCIAL

## 2025-04-16 VITALS
SYSTOLIC BLOOD PRESSURE: 110 MMHG | DIASTOLIC BLOOD PRESSURE: 78 MMHG | HEART RATE: 78 BPM | HEIGHT: 60 IN | WEIGHT: 105.8 LBS | OXYGEN SATURATION: 98 % | BODY MASS INDEX: 20.77 KG/M2

## 2025-04-16 DIAGNOSIS — Z00.00 HEALTH CARE MAINTENANCE: ICD-10-CM

## 2025-04-16 DIAGNOSIS — J01.10 ACUTE NON-RECURRENT FRONTAL SINUSITIS: Primary | ICD-10-CM

## 2025-04-16 DIAGNOSIS — E53.8 B12 DEFICIENCY: Primary | ICD-10-CM

## 2025-04-16 DIAGNOSIS — J30.2 SEASONAL ALLERGIES: ICD-10-CM

## 2025-04-16 DIAGNOSIS — D50.9 IRON DEFICIENCY ANEMIA, UNSPECIFIED IRON DEFICIENCY ANEMIA TYPE: ICD-10-CM

## 2025-04-16 PROCEDURE — 99213 OFFICE O/P EST LOW 20 MIN: CPT | Performed by: INTERNAL MEDICINE

## 2025-04-16 PROCEDURE — 3008F BODY MASS INDEX DOCD: CPT | Performed by: INTERNAL MEDICINE

## 2025-04-16 PROCEDURE — 1036F TOBACCO NON-USER: CPT | Performed by: INTERNAL MEDICINE

## 2025-04-16 RX ORDER — FLUTICASONE PROPIONATE 50 MCG
SPRAY, SUSPENSION (ML) NASAL
Qty: 16 G | Refills: 3 | Status: SHIPPED | OUTPATIENT
Start: 2025-04-16

## 2025-04-16 RX ORDER — AZITHROMYCIN 250 MG/1
TABLET, FILM COATED ORAL
Qty: 6 TABLET | Refills: 0 | Status: SHIPPED | OUTPATIENT
Start: 2025-04-16

## 2025-04-16 NOTE — PROGRESS NOTES
Subjective   Patient ID: Radha Harris is a 45 y.o. female who presents for Sinusitis and Headache.    Here complaining of nasal discharge, productive of yellow phlegm over the last 5 days.  Her 6-year-old son is ill.  She has been trying over-the-counter remedies without success.  She will be flying in 2 days on vacation for a week.  Denies fevers chills no nausea vomiting or diarrhea.  Presently on menses.    She remembers last spring she had allergies-she tried Allegra that but that had a side effect.  She notes her spring allergies have worsened each season over the last 4 to 5 years or so    She has a history of allergic conjunctivitis but does not remember the prescription eyedrop that has been provided by her eye provider         Review of Systems    Objective   /78 (BP Location: Right arm, Patient Position: Sitting, BP Cuff Size: Adult)   Pulse 78   Ht 1.524 m (5')   Wt 48 kg (105 lb 12.8 oz)   SpO2 98%   BMI 20.66 kg/m²     Physical Exam  Constitutional:       Comments: This is a well-developed patient, a bit congested, in no respiratory distress  Eye exam revealed pupils equally round and reactive, with extraocular muscles intact. Normal sclera and eyelids.  Minimal sinus pressure  Ear exam without pain on palpation. Otic canal without obvious tympanic membrane erythema or air-fluid levels.  Oral exam revealed unremarkable lips, pharynx and palate. No obvious ulcers, exudates or lesions noted.  Neck exam revealed no masses, adenopathy or thyromegaly. No neck pain on range of motion was noted.  Pulmonary exam revealed clear breath sounds bilaterally in all lung fields. No wheezes bronchitis or rales noted.  Cardiac exam revealed regular rate and rhythm without murmurs gallops or rubs.  Skin exam without any rash         Assessment/Plan   Problem List Items Addressed This Visit           ICD-10-CM    Seasonal allergies J30.2     Other Visit Diagnoses         Codes      Acute non-recurrent  frontal sinusitis    -  Primary J01.10    Relevant Medications    azithromycin (Zithromax) 250 mg tablet    fluticasone (Flonase) 50 mcg/actuation nasal spray             Developing sinusitis/seasonal rhinitis-suspected allergies-  Regarding her postnasal drip, and sinus congestion productive of yellow phlegm-unfortunately with her flying in 2 days, it is unclear whether this will improve or worsen over that time which could make her quite very uncomfortable.  I encouraged the Flonase nasal spray twice daily over the next 7 to 10 days and also use a Z-Dave as directed with a probiotic.      Regarding allergic conjunctivitis encouraged her to get the eyedrop from her vision provider in anticipation of the spring pollen season [place].  As above she did not tolerate Allegra.      She will follow-up with concerns otherwise as scheduled this summer with her PCP, Dr. Shah    Charting was completed using voice recognition technology and may include unintended errors.

## 2025-05-16 ENCOUNTER — OFFICE VISIT (OUTPATIENT)
Dept: PRIMARY CARE | Facility: CLINIC | Age: 45
End: 2025-05-16
Payer: COMMERCIAL

## 2025-05-16 VITALS
OXYGEN SATURATION: 98 % | TEMPERATURE: 97.1 F | WEIGHT: 103 LBS | BODY MASS INDEX: 20.12 KG/M2 | HEART RATE: 83 BPM | SYSTOLIC BLOOD PRESSURE: 100 MMHG | RESPIRATION RATE: 16 BRPM | DIASTOLIC BLOOD PRESSURE: 68 MMHG

## 2025-05-16 DIAGNOSIS — R39.9 UTI SYMPTOMS: ICD-10-CM

## 2025-05-16 LAB
POC APPEARANCE, URINE: ABNORMAL
POC BILIRUBIN, URINE: NEGATIVE
POC BLOOD, URINE: ABNORMAL
POC COLOR, URINE: YELLOW
POC GLUCOSE, URINE: NEGATIVE MG/DL
POC KETONES, URINE: NEGATIVE MG/DL
POC LEUKOCYTES, URINE: ABNORMAL
POC NITRITE,URINE: NEGATIVE
POC PH, URINE: 5 PH
POC PROTEIN, URINE: ABNORMAL MG/DL
POC SPECIFIC GRAVITY, URINE: 1.01
POC UROBILINOGEN, URINE: 1 EU/DL

## 2025-05-16 PROCEDURE — 99213 OFFICE O/P EST LOW 20 MIN: CPT | Performed by: NURSE PRACTITIONER

## 2025-05-16 PROCEDURE — 1036F TOBACCO NON-USER: CPT | Performed by: NURSE PRACTITIONER

## 2025-05-16 PROCEDURE — 81002 URINALYSIS NONAUTO W/O SCOPE: CPT | Performed by: NURSE PRACTITIONER

## 2025-05-16 RX ORDER — NITROFURANTOIN 25; 75 MG/1; MG/1
100 CAPSULE ORAL 2 TIMES DAILY
Qty: 10 CAPSULE | Refills: 0 | Status: SHIPPED | OUTPATIENT
Start: 2025-05-16 | End: 2025-05-21

## 2025-05-16 ASSESSMENT — ENCOUNTER SYMPTOMS
HEMATURIA: 0
RESPIRATORY NEGATIVE: 1
FEVER: 0
FLANK PAIN: 0
ABDOMINAL PAIN: 0
FREQUENCY: 1
APPETITE CHANGE: 0
NAUSEA: 0
VOMITING: 0
CARDIOVASCULAR NEGATIVE: 1
DIAPHORESIS: 0
DYSURIA: 1
FATIGUE: 0
DIARRHEA: 0

## 2025-05-16 NOTE — PROGRESS NOTES
Subjective   Patient ID: Radha Harris is a 45 y.o. female who presents for Urinary Frequency.    Patient's symptoms started last night with urinary frequency and burning with urination. No blood in the urine. Pt has urinary urgency. No abdominal pain, nausea or vomiting. No chance of pregnancy. No concerns for STIs.     Review of Systems   Constitutional:  Negative for appetite change, diaphoresis, fatigue and fever.   HENT: Negative.     Respiratory: Negative.     Cardiovascular: Negative.    Gastrointestinal:  Negative for abdominal pain, diarrhea, nausea and vomiting.   Genitourinary:  Positive for dysuria, frequency and urgency. Negative for flank pain and hematuria.       Objective   /68   Pulse 83   Temp 36.2 °C (97.1 °F)   Resp 16   Wt 46.7 kg (103 lb)   SpO2 98%   BMI 20.12 kg/m²     Physical Exam  Vitals reviewed.   Constitutional:       General: She is not in acute distress.     Appearance: Normal appearance. She is not ill-appearing or toxic-appearing.   HENT:      Head: Atraumatic.   Cardiovascular:      Rate and Rhythm: Normal rate and regular rhythm.      Heart sounds: Normal heart sounds. No murmur heard.  Pulmonary:      Effort: Pulmonary effort is normal.      Breath sounds: Normal breath sounds. No wheezing or rhonchi.   Abdominal:      General: Bowel sounds are normal. There is no distension.      Palpations: Abdomen is soft.      Tenderness: There is no abdominal tenderness. There is no right CVA tenderness, left CVA tenderness, guarding or rebound.   Skin:     General: Skin is warm and dry.   Neurological:      General: No focal deficit present.      Mental Status: She is alert.   Psychiatric:         Mood and Affect: Mood normal.     Assessment/Plan   Problem List Items Addressed This Visit    None  Visit Diagnoses         Codes      UTI symptoms     R39.9    Relevant Medications    nitrofurantoin, macrocrystal-monohydrate, (Macrobid) 100 mg capsule    Other Relevant Orders     POCT UA (nonautomated) manually resulted (Completed)    Urine Culture        UA indicative of a UTI. will start patient on macrobid at this time. will send urine out for culture. patient advised to call the office if symptoms persist in the next 2-3 days despite the use of the medication. patient advised to go to the ER for any fevers, chills, abdominal pain, nausea, vomiting or new/concerning symptoms; she agreed. will call pt when urine culture results become available.

## 2025-05-18 LAB — BACTERIA UR CULT: ABNORMAL

## 2025-05-19 ENCOUNTER — TELEPHONE (OUTPATIENT)
Dept: PRIMARY CARE | Facility: CLINIC | Age: 45
End: 2025-05-19
Payer: COMMERCIAL

## 2025-05-19 NOTE — TELEPHONE ENCOUNTER
Spoke to patient and relayed results of urine culture which is positive for UTI. The macrobid is appropriate. Patient advised to finish the course of treatment and follow up if no better

## 2025-07-15 LAB
ALBUMIN SERPL-MCNC: 4.1 G/DL (ref 3.6–5.1)
ALP SERPL-CCNC: 37 U/L (ref 31–125)
ALT SERPL-CCNC: 15 U/L (ref 6–29)
ANION GAP SERPL CALCULATED.4IONS-SCNC: 9 MMOL/L (CALC) (ref 7–17)
AST SERPL-CCNC: 20 U/L (ref 10–35)
BILIRUB SERPL-MCNC: 0.7 MG/DL (ref 0.2–1.2)
BUN SERPL-MCNC: 14 MG/DL (ref 7–25)
CALCIUM SERPL-MCNC: 8.6 MG/DL (ref 8.6–10.2)
CHLORIDE SERPL-SCNC: 103 MMOL/L (ref 98–110)
CHOLEST SERPL-MCNC: 141 MG/DL
CHOLEST/HDLC SERPL: 2.7 (CALC)
CO2 SERPL-SCNC: 28 MMOL/L (ref 20–32)
CREAT SERPL-MCNC: 0.69 MG/DL (ref 0.5–0.99)
EGFRCR SERPLBLD CKD-EPI 2021: 109 ML/MIN/1.73M2
ERYTHROCYTE [DISTWIDTH] IN BLOOD BY AUTOMATED COUNT: 19.3 % (ref 11–15)
FERRITIN SERPL-MCNC: 119 NG/ML (ref 16–232)
GLUCOSE SERPL-MCNC: 88 MG/DL (ref 65–99)
HCT VFR BLD AUTO: 33.1 % (ref 35–45)
HDLC SERPL-MCNC: 53 MG/DL
HGB BLD-MCNC: 10.7 G/DL (ref 11.7–15.5)
IRON SATN MFR SERPL: 55 % (CALC) (ref 16–45)
IRON SERPL-MCNC: 141 MCG/DL (ref 40–190)
LDLC SERPL CALC-MCNC: 74 MG/DL (CALC)
MCH RBC QN AUTO: 27.1 PG (ref 27–33)
MCHC RBC AUTO-ENTMCNC: 32.3 G/DL (ref 32–36)
MCV RBC AUTO: 83.8 FL (ref 80–100)
NONHDLC SERPL-MCNC: 88 MG/DL (CALC)
PLATELET # BLD AUTO: 249 THOUSAND/UL (ref 140–400)
PMV BLD REES-ECKER: 9.5 FL (ref 7.5–12.5)
POTASSIUM SERPL-SCNC: 4.3 MMOL/L (ref 3.5–5.3)
PROT SERPL-MCNC: 6.6 G/DL (ref 6.1–8.1)
RBC # BLD AUTO: 3.95 MILLION/UL (ref 3.8–5.1)
SODIUM SERPL-SCNC: 140 MMOL/L (ref 135–146)
TIBC SERPL-MCNC: 257 MCG/DL (CALC) (ref 250–450)
TRIGL SERPL-MCNC: 60 MG/DL
VIT B12 SERPL-MCNC: 521 PG/ML (ref 200–1100)
WBC # BLD AUTO: 3.2 THOUSAND/UL (ref 3.8–10.8)

## 2025-07-17 ENCOUNTER — APPOINTMENT (OUTPATIENT)
Dept: PRIMARY CARE | Facility: CLINIC | Age: 45
End: 2025-07-17
Payer: COMMERCIAL

## 2025-07-17 VITALS
WEIGHT: 107 LBS | BODY MASS INDEX: 20.2 KG/M2 | SYSTOLIC BLOOD PRESSURE: 98 MMHG | HEART RATE: 61 BPM | HEIGHT: 61 IN | OXYGEN SATURATION: 98 % | DIASTOLIC BLOOD PRESSURE: 62 MMHG

## 2025-07-17 DIAGNOSIS — Z00.00 HEALTH CARE MAINTENANCE: Primary | ICD-10-CM

## 2025-07-17 DIAGNOSIS — Z12.31 ENCOUNTER FOR SCREENING MAMMOGRAM FOR MALIGNANT NEOPLASM OF BREAST: ICD-10-CM

## 2025-07-17 DIAGNOSIS — E53.8 B12 DEFICIENCY: ICD-10-CM

## 2025-07-17 DIAGNOSIS — S39.012A BACK STRAIN, INITIAL ENCOUNTER: ICD-10-CM

## 2025-07-17 DIAGNOSIS — D50.9 IRON DEFICIENCY ANEMIA, UNSPECIFIED IRON DEFICIENCY ANEMIA TYPE: ICD-10-CM

## 2025-07-17 PROCEDURE — 99396 PREV VISIT EST AGE 40-64: CPT | Performed by: INTERNAL MEDICINE

## 2025-07-17 PROCEDURE — 3008F BODY MASS INDEX DOCD: CPT | Performed by: INTERNAL MEDICINE

## 2025-07-17 ASSESSMENT — ENCOUNTER SYMPTOMS
ABDOMINAL PAIN: 0
DYSURIA: 0
SORE THROAT: 0
VOMITING: 0
COUGH: 0
WOUND: 0
NERVOUS/ANXIOUS: 0
CHILLS: 0
AGITATION: 0
HEMATURIA: 0
PALPITATIONS: 0
FEVER: 0
EYE PAIN: 0
UNEXPECTED WEIGHT CHANGE: 0
BLOOD IN STOOL: 0
POLYDIPSIA: 0
RHINORRHEA: 0
WHEEZING: 0
MYALGIAS: 0
POLYPHAGIA: 0
ARTHRALGIAS: 1
FREQUENCY: 0
SHORTNESS OF BREATH: 0
DIARRHEA: 0
NAUSEA: 0
DIZZINESS: 0
HEADACHES: 0
CHEST TIGHTNESS: 0
CONSTIPATION: 0
DYSPHORIC MOOD: 0

## 2025-07-17 NOTE — PROGRESS NOTES
"Subjective   Patient ID: Radha Harris is a 45 y.o. female who presents for Annual Exam.    HPI     Dental visits- UTD  Eye visits- UTD, contacts    Exercise- Daily-yoga, runs, weights, swim  Diet- healthy    Alcohol use-socially  Smoking- none    Always knee pain  3 weeks of left side sore back. No injury  Just comes at random  Does not hurt with movement  Exercise does not bother her  Does not strain    Has some bumped around vagina. Has had a cyst for years and had it checked by gyn years ago--not changed but has some new ones near vulva    Review of Systems   Constitutional:  Negative for chills, fever and unexpected weight change.   HENT:  Negative for congestion, hearing loss, rhinorrhea and sore throat.    Eyes:  Negative for pain and visual disturbance.   Respiratory:  Negative for cough, chest tightness, shortness of breath and wheezing.    Cardiovascular:  Negative for chest pain and palpitations.   Gastrointestinal:  Negative for abdominal pain, blood in stool, constipation, diarrhea, nausea and vomiting.   Endocrine: Negative for cold intolerance, heat intolerance, polydipsia and polyphagia.   Genitourinary:  Negative for dysuria, frequency and hematuria.   Musculoskeletal:  Positive for arthralgias. Negative for myalgias.   Skin:  Negative for rash and wound.   Neurological:  Negative for dizziness, syncope and headaches.   Psychiatric/Behavioral:  Negative for agitation and dysphoric mood. The patient is not nervous/anxious.        Objective   BP 98/62 (BP Location: Left arm, Patient Position: Sitting, BP Cuff Size: Small adult)   Pulse 61   Ht (!) 1.54 m (5' 0.63\")   Wt 48.5 kg (107 lb)   SpO2 98%   BMI 20.46 kg/m²     Physical Exam  Vitals reviewed.   Constitutional:       Appearance: Normal appearance. She is not ill-appearing.   HENT:      Head: Normocephalic and atraumatic.      Right Ear: Tympanic membrane normal.      Left Ear: Tympanic membrane normal.      Nose: Nose normal.      " Mouth/Throat:      Mouth: Mucous membranes are moist.      Pharynx: Oropharynx is clear.     Eyes:      Extraocular Movements: Extraocular movements intact.      Conjunctiva/sclera: Conjunctivae normal.      Pupils: Pupils are equal, round, and reactive to light.       Cardiovascular:      Rate and Rhythm: Normal rate and regular rhythm.   Pulmonary:      Effort: Pulmonary effort is normal.      Breath sounds: Normal breath sounds. No wheezing.   Abdominal:      General: There is no distension.      Palpations: Abdomen is soft. There is no mass.      Tenderness: There is no abdominal tenderness.   Genitourinary:       Comments: Yellow [place]- cyst noted- smooth rounded pea sized cyst  Arrow- small follicle that is prominent     Musculoskeletal:      Cervical back: Neck supple.      Right lower leg: No edema.      Left lower leg: No edema.      Comments: Negative LENY  Gait: normal  Coordination: normal  Spinal TTP: none  Spinal stepoffs: none  Sacroiliac TTP: none  Paraspinal muscles: nontender, no spasm  Cervical ROM  flexion: normal  extension: normal  Lumbar ROM  flexion: normal  extension: normal  rotation: normal  Biceps strength: 5/5 bilaterally  Triceps strength: 5/5 bilaterally  Lower extremity strength: 5/5 bilaterally     Lymphadenopathy:      Cervical: No cervical adenopathy.     Neurological:      General: No focal deficit present.      Mental Status: She is alert and oriented to person, place, and time.      Gait: Gait normal.     Psychiatric:         Mood and Affect: Mood normal.         Behavior: Behavior normal.         Thought Content: Thought content normal.         Assessment/Plan   Problem List Items Addressed This Visit           ICD-10-CM    Anemia D64.9    Relevant Orders    CBC and Auto Differential    Iron and TIBC    Ferritin    B12 deficiency (Chronic) E53.8    Relevant Orders    Vitamin B12     Other Visit Diagnoses         Codes      Encounter for screening mammogram for malignant  neoplasm of breast    -  Primary Z12.31    Relevant Orders    BI mammo bilateral screening tomosynthesis      Health care maintenance     Z00.00    Relevant Orders    Comprehensive Metabolic Panel    Lipid Panel             CPE completed.  Advised to keep a heart healthy, low fat diet with fruits and veggies like Mediterranean diet.  Advised on the importance of exercise and maintaining 150 minutes of exercise per week (30 minutes per day 5 days a week).  Advised on regular eye and dental visits.  Discussed age appropriate cancer screening, immunizations and recommendations given.  Discussed avoiding illicit drugs and tobacco. Advised on appropriate use of alcohol.  Advised to wear seat belt.    Encouraged stretching before exercise  Advised can use warm compress to follicle. Not red or infected  Advised if cyst changes-would recommend returning to gyn     Impingement of right shoulder-stretching  -PT exercises on youtube  -if not resolving-PT and ortho     Hx of colitis and hospitalization in 2017: never occurred again     Anemia 2/2 beta thalassemia minor: saw heme  -on iron     Left knee pain: seeing ortho and doing PT     Coccyx pain: prominent coccyx bone, recs xrays-wants to wait as not too bothersome     Diverticulosis     CPE 1 year. Labs and mammo ordered.      Health Maintenance  -Pap smear: 3/22/24-normal, repeat 5 years  -Mammogram: 4/24 normal after diagnostic  -Colonoscopy: 6/19/2017-received records. Normal and repeat 2027  -Vaccinations: UTD tdap (2018)  -DEXA: at 65     . 1 son.

## 2025-09-04 ENCOUNTER — HOSPITAL ENCOUNTER (OUTPATIENT)
Dept: RADIOLOGY | Facility: CLINIC | Age: 45
Discharge: HOME | End: 2025-09-04
Payer: COMMERCIAL

## 2025-09-04 VITALS — BODY MASS INDEX: 21.01 KG/M2 | WEIGHT: 107 LBS | HEIGHT: 60 IN

## 2025-09-04 DIAGNOSIS — Z12.31 ENCOUNTER FOR SCREENING MAMMOGRAM FOR MALIGNANT NEOPLASM OF BREAST: ICD-10-CM

## 2025-09-04 PROCEDURE — 77067 SCR MAMMO BI INCL CAD: CPT | Performed by: RADIOLOGY

## 2025-09-04 PROCEDURE — 77063 BREAST TOMOSYNTHESIS BI: CPT | Performed by: RADIOLOGY

## 2025-09-04 PROCEDURE — 77067 SCR MAMMO BI INCL CAD: CPT

## 2026-07-17 ENCOUNTER — APPOINTMENT (OUTPATIENT)
Dept: PRIMARY CARE | Facility: CLINIC | Age: 46
End: 2026-07-17
Payer: COMMERCIAL